# Patient Record
Sex: MALE | Race: WHITE | NOT HISPANIC OR LATINO | Employment: FULL TIME | URBAN - METROPOLITAN AREA
[De-identification: names, ages, dates, MRNs, and addresses within clinical notes are randomized per-mention and may not be internally consistent; named-entity substitution may affect disease eponyms.]

---

## 2017-03-21 ENCOUNTER — TELEPHONE (OUTPATIENT)
Dept: FAMILY MEDICINE | Facility: CLINIC | Age: 27
End: 2017-03-21

## 2017-03-21 ENCOUNTER — OFFICE VISIT (OUTPATIENT)
Dept: FAMILY MEDICINE | Facility: CLINIC | Age: 27
End: 2017-03-21
Payer: COMMERCIAL

## 2017-03-21 VITALS
SYSTOLIC BLOOD PRESSURE: 130 MMHG | OXYGEN SATURATION: 97 % | HEIGHT: 70 IN | DIASTOLIC BLOOD PRESSURE: 82 MMHG | WEIGHT: 212.9 LBS | HEART RATE: 72 BPM | BODY MASS INDEX: 30.48 KG/M2 | TEMPERATURE: 97.2 F

## 2017-03-21 DIAGNOSIS — J01.90 ACUTE SINUSITIS WITH SYMPTOMS > 10 DAYS: ICD-10-CM

## 2017-03-21 PROCEDURE — 99213 OFFICE O/P EST LOW 20 MIN: CPT | Performed by: FAMILY MEDICINE

## 2017-03-21 NOTE — PROGRESS NOTES
"  SUBJECTIVE:                                                    Julian Arroyo is a 26 year old male who presents to clinic today for the following health issues:    RESPIRATORY SYMPTOMS      Duration: 3 weeks     Description  Sinus problems     Severity: moderate    Accompanying signs and symptoms: sinus facial pain,congestion and mucus     History (predisposing factors):  Sinus problems in the past, surgery    Therapies tried and outcome:  Mucinex        Trend of symptoms: worsening  Denies These symptoms: fever, ear pain, myalgia  History of: recurrent sinusitis  Ill contacts: others at home with similar illness  Review of symptoms except as noted in the HPI is otherwise negative.    MEDICATIONS  No current outpatient prescriptions on file.     Allergies:  No Known Allergies    SOCIAL   reports that he has never smoked. He has never used smokeless tobacco.    OBJECTIVE:  /82  Pulse 72  Temp 97.2  F (36.2  C) (Oral)  Ht 5' 10\" (1.778 m)  Wt 212 lb 14.4 oz (96.6 kg)  SpO2 97%  BMI 30.55 kg/m2  General appearance: healthy, alert and cooperative.  Left Ear: normal; external ear canal and TM clear, nontender.  Right Ear: normal; external ear canal and TM clear, nontender.  Nose: mucosal erythema  Sinuses: maxillary tenderness bilaterally  Oropharynx: normal pharynx and buccal mucosa. Dental hygeine adequate.  Neck: normal; supple with no masses or nodes.    ASSESSMENT/PLAN:    ICD-10-CM    1. Acute sinusitis with symptoms > 10 days J01.90 amoxicillin-clavulanate (AUGMENTIN) 875-125 MG per tablet     History of sinus surgery and worsening symptoms now  Consideration of allergies as a trigger so try adding Flonase after this episode to see if that helps    Instructions on use of tylenol or ibuprofen for relief of pain and fever we given.  Call or return to clinic if these symptoms worsen or fail to improve as anticipated.  Micha Ramirez MD MPH    "

## 2017-03-21 NOTE — MR AVS SNAPSHOT
"              After Visit Summary   3/21/2017    Julian Arroyo    MRN: [id number]           Patient Information     Date Of Birth          1990        Visit Information        Provider Department      3/21/2017 12:15 PM Micha Ramirez MD [facility]        Today's Diagnoses     Acute sinusitis with symptoms > 10 days           Follow-ups after your visit        Follow-up notes from your care team     Return if symptoms worsen or fail to improve.      Who to contact     If you have questions or need follow up information about today's clinic visit or your schedule please contact [facility] directly at [phone].  Normal or non-critical lab and imaging results will be communicated to you by MyChart, letter or phone within 4 business days after the clinic has received the results. If you do not hear from us within 7 days, please contact the clinic through [employer] or phone. If you have a critical or abnormal lab result, we will notify you by phone as soon as possible.  Submit refill requests through [employer] or call your pharmacy and they will forward the refill request to us. Please allow 3 business days for your refill to be completed.          Additional Information About Your Visit        MyChart Information     [employer] gives you secure access to your electronic health record. If you see a primary care provider, you can also send messages to your care team and make appointments. If you have questions, please call your primary care clinic.  If you do not have a primary care provider, please call [phone] and they will assist you.        Care EveryWhere ID     This is your Care EveryWhere ID. This could be used by other organizations to access your [facility] medical records  [id number]        Your Vitals Were     Pulse Temperature Height Pulse Oximetry BMI (Body Mass Index)       72 97.2  F (36.2  C) (Oral) 5' 10\" (1.778 m) 97% 30.55 kg/m2        Blood Pressure from Last 3 " Encounters:   03/21/17 130/82   12/29/16 128/84   09/05/14 138/88    Weight from Last 3 Encounters:   03/21/17 212 lb 14.4 oz (96.6 kg)   12/29/16 200 lb 11.2 oz (91 kg)   09/05/14 217 lb 6.4 oz (98.6 kg)              Today, you had the following     No orders found for display         Where to get your medicines      These medications were sent to [place] [address] [facility] - [place], [place] - [address]  [address] [address]     Phone:  [phone]     amoxicillin-clavulanate 875-125 MG per tablet          Primary Care Provider Office Phone # Fax #    Micha Francisco Javier Ramirez [phone]       [facility] [address] [address]        Thank you!     Thank you for choosing [facility]  for your care. Our goal is always to provide you with excellent care. Hearing back from our patients is one way we can continue to improve our services. Please take a few minutes to complete the written survey that you may receive in the mail after your visit with us. Thank you!             Your Updated Medication List - Protect others around you: Learn how to safely use, store and throw away your medicines at www.disposemymeds.org.          This list is accurate as of: 3/21/17 12:32 PM.  Always use your most recent med list.                   Brand Name Dispense Instructions for use    amoxicillin-clavulanate 875-125 MG per tablet    AUGMENTIN    20 tablet    Take 1 tablet by mouth 2 times daily

## 2017-03-21 NOTE — TELEPHONE ENCOUNTER
Reason for call:  Patient reporting a symptom    Symptom or request: sinusitis    Duration (how long have symptoms been present): 3 weeks    Have you been treated for this before? Yes; previously took steroids     Additional comments: wants to speak to triage nurse whether  He should come in or not;          [facility] - [place], [place] - [address]      Phone Number patient can be reached at:  Home number on file [phone] (home)    Best Time:      Can we leave a detailed message on this number:  YES    Call taken on 3/21/2017 at 9:01 AM by Karla Liu

## 2018-03-02 ENCOUNTER — OFFICE VISIT (OUTPATIENT)
Dept: FAMILY MEDICINE | Facility: CLINIC | Age: 28
End: 2018-03-02
Payer: COMMERCIAL

## 2018-03-02 VITALS
HEART RATE: 75 BPM | OXYGEN SATURATION: 98 % | SYSTOLIC BLOOD PRESSURE: 134 MMHG | BODY MASS INDEX: 32.56 KG/M2 | WEIGHT: 227.4 LBS | DIASTOLIC BLOOD PRESSURE: 82 MMHG | HEIGHT: 70 IN

## 2018-03-02 DIAGNOSIS — R53.83 FATIGUE, UNSPECIFIED TYPE: ICD-10-CM

## 2018-03-02 DIAGNOSIS — Z00.00 ROUTINE GENERAL MEDICAL EXAMINATION AT A HEALTH CARE FACILITY: Primary | ICD-10-CM

## 2018-03-02 DIAGNOSIS — Z13.220 SCREENING CHOLESTEROL LEVEL: ICD-10-CM

## 2018-03-02 DIAGNOSIS — Z23 NEED FOR PROPHYLACTIC VACCINATION WITH TETANUS-DIPHTHERIA (TD): ICD-10-CM

## 2018-03-02 DIAGNOSIS — Z23 ENCOUNTER FOR IMMUNIZATION: ICD-10-CM

## 2018-03-02 LAB
CHOLEST SERPL-MCNC: 235 MG/DL
GLUCOSE SERPL-MCNC: 88 MG/DL (ref 70–99)
HDLC SERPL-MCNC: 50 MG/DL
LDLC SERPL CALC-MCNC: 167 MG/DL
NONHDLC SERPL-MCNC: 185 MG/DL
TRIGL SERPL-MCNC: 92 MG/DL
TSH SERPL DL<=0.005 MIU/L-ACNC: 1.19 MU/L (ref 0.4–4)

## 2018-03-02 PROCEDURE — 99395 PREV VISIT EST AGE 18-39: CPT | Mod: 25 | Performed by: FAMILY MEDICINE

## 2018-03-02 PROCEDURE — 87389 HIV-1 AG W/HIV-1&-2 AB AG IA: CPT | Performed by: FAMILY MEDICINE

## 2018-03-02 PROCEDURE — 90715 TDAP VACCINE 7 YRS/> IM: CPT | Performed by: FAMILY MEDICINE

## 2018-03-02 PROCEDURE — 82947 ASSAY GLUCOSE BLOOD QUANT: CPT | Performed by: FAMILY MEDICINE

## 2018-03-02 PROCEDURE — 36415 COLL VENOUS BLD VENIPUNCTURE: CPT | Performed by: FAMILY MEDICINE

## 2018-03-02 PROCEDURE — 80061 LIPID PANEL: CPT | Performed by: FAMILY MEDICINE

## 2018-03-02 PROCEDURE — 87491 CHLMYD TRACH DNA AMP PROBE: CPT | Performed by: FAMILY MEDICINE

## 2018-03-02 PROCEDURE — 84443 ASSAY THYROID STIM HORMONE: CPT | Performed by: FAMILY MEDICINE

## 2018-03-02 PROCEDURE — 87591 N.GONORRHOEAE DNA AMP PROB: CPT | Performed by: FAMILY MEDICINE

## 2018-03-02 PROCEDURE — 90471 IMMUNIZATION ADMIN: CPT | Performed by: FAMILY MEDICINE

## 2018-03-02 NOTE — NURSING NOTE
"Chief Complaint   Patient presents with     Patient Request     Bio-metric measurement      Labs     Would like thyroid tested - family hx of thyroid complications        Initial /82 (BP Location: Left arm, Patient Position: Chair, Cuff Size: Adult Regular)  Pulse 75  Ht 5' 10\" (1.778 m)  Wt 227 lb 6.4 oz (103.1 kg)  SpO2 98%  BMI 32.63 kg/m2 Estimated body mass index is 32.63 kg/(m^2) as calculated from the following:    Height as of this encounter: 5' 10\" (1.778 m).    Weight as of this encounter: 227 lb 6.4 oz (103.1 kg).  Medication Reconciliation: complete     Olga Plunkett MA     "

## 2018-03-02 NOTE — PATIENT INSTRUCTIONS
Preventive Health Recommendations  Male Ages 26 - 39    Yearly exam:             See your health care provider every year in order to  o   Review health changes.   o   Discuss preventive care.    o   Review your medicines if your doctor has prescribed any.    You should be tested each year for STDs (sexually transmitted diseases), if you re at risk.     After age 35, talk to your provider about cholesterol testing. If you are at risk for heart disease, have your cholesterol tested at least every 5 years.     If you are at risk for diabetes, you should have a diabetes test (fasting glucose).  Shots: Get a flu shot each year. Get a tetanus shot every 10 years.     Nutrition:    Eat at least 5 servings of fruits and vegetables daily.     Eat whole-grain bread, whole-wheat pasta and brown rice instead of white grains and rice.     Talk to your provider about Calcium and Vitamin D.     Lifestyle    Exercise for at least 150 minutes a week (30 minutes a day, 5 days a week). This will help you control your weight and prevent disease.     Limit alcohol to one drink per day.     No smoking.     Wear sunscreen to prevent skin cancer.     See your dentist every six months for an exam and cleaning.     Call if you would like help connecting to ENT over the coming year regarding your history of recurrent sinus infections with surgery when you were 19.  Consider slowing down eating process.    Consider chewing each mouthful 25 times before another mouthful. This can slow eating and improve  digestion as well as possibly decrease reflux.    Consider lotrimin cream to feet occasionally for mild athlete's foot.  Consider OTC hydrocortisone if skin redness on forehead becomes bothersome.

## 2018-03-02 NOTE — PROGRESS NOTES
SUBJECTIVE:   CC: Julian Arroyo is an 27 year old male who presents for preventative health visit.     Answers for HPI/ROS submitted by the patient on 3/2/2018   Annual Exam:  Getting at least 3 servings of Calcium per day:: Yes  Bi-annual eye exam:: Yes  Dental care twice a year:: Yes  Sleep apnea or symptoms of sleep apnea:: Daytime drowsiness, Excessive snoring, Sleep apnea  Diet:: Regular (no restrictions)  Frequency of exercise:: 6-7 days/week  Taking medications regularly:: Yes  Medication side effects:: Not applicable  Additional concerns today:: No  PHQ-2 Score: 0  Duration of exercise:: Greater than 60 minutes    Work is going well.    Today's PHQ-2 Score:   PHQ-2 ( 1999 [employer]) 3/2/2018 3/2/2018   Q1: Little interest or pleasure in doing things 0 0   Q2: Feeling down, depressed or hopeless 0 0   PHQ-2 Score 0 0   Q1: Little interest or pleasure in doing things Not at all -   Q2: Feeling down, depressed or hopeless Not at all -   PHQ-2 Score 0 -     Wonders about thyroid, has a family history of low thyroid. He does get tired throughout the day. Can fall asleep if sedentary for a bit.  He does get frequent sinus infections. Had surgery when he was 19. This past year seemed a bit of a flare. Had been to ENT in the past, thought about having a check up with ENT if it happens again. Has a prescription for flonase, not using it now, but uses it when it flares up and it helps.    Physical Activity: he is pretty good with physical activity. He works out six days a week.  Nutrition: He doesn't eat as healthy as he thinks he could. He can eat until he feels too full. He mostly eats at home. Choices of foods are generally good, but perhaps portion sizes can be big.     Abuse: Current or Past(Physical, Sexual or Emotional)- No  Do you feel safe in your environment - Yes    Social History   Substance Use Topics     Smoking status: Never Smoker     Smokeless tobacco: Never Used     Alcohol use 0.0 oz/week     "  If you drink alcohol do you typically have >3 drinks per day or >7 drinks per week? No                      Last PSA: No results found for: PSA    Reviewed orders with patient. Reviewed health maintenance and updated orders accordingly - Yes    Reviewed and updated as needed this visit by clinical staff  Tobacco  Allergies  Meds  Problems  Med Hx  Surg Hx  Fam Hx  Soc Hx          Reviewed and updated as needed this visit by Provider  Allergies  Meds  Problems          ROS:  C: NEGATIVE for fever, chills, change in weight  I: NEGATIVE for worrisome rashes, moles or lesions  E: NEGATIVE for vision changes or irritation  ENT: NEGATIVE for ear, mouth and throat problems  R: NEGATIVE for significant cough or SOB  CV: NEGATIVE for chest pain, palpitations or peripheral edema  GI: NEGATIVE for nausea, abdominal pain, or change in bowel habits - does have some heartburn - not constant   male: negative for dysuria, hematuria, decreased urinary stream, erectile dysfunction, urethral discharge  M: NEGATIVE for significant arthralgias or myalgia  N: NEGATIVE for weakness, dizziness or paresthesias  P: NEGATIVE for changes in mood or affect    OBJECTIVE:   /82 (BP Location: Left arm, Patient Position: Chair, Cuff Size: Adult Regular)  Pulse 75  Ht 5' 10\" (1.778 m)  Wt 227 lb 6.4 oz (103.1 kg)  SpO2 98%  BMI 32.63 kg/m2  EXAM:  GENERAL: healthy, alert and no distress  EYES: Eyes grossly normal to inspection, PERRL and conjunctivae and sclerae normal  HENT: ear canals and TM's normal, nose and mouth without ulcers or lesions  NECK: no adenopathy, no asymmetry, masses, or scars and thyroid normal to palpation  RESP: lungs clear to auscultation - no rales, rhonchi or wheezes  CV: regular rate and rhythm, normal S1 S2, no S3 or S4, no murmur, click or rub, no peripheral edema and peripheral pulses strong  ABDOMEN: soft, nontender, no hepatosplenomegaly, no masses and bowel sounds normal  MS: no gross " "musculoskeletal defects noted, no edema  SKIN: no suspicious lesions or rashes  NEURO: Normal strength and tone, mentation intact and speech normal  PSYCH: mentation appears normal, affect normal/bright    ASSESSMENT/PLAN:   Julian was seen today for patient request and labs.    Diagnoses and all orders for this visit:    Routine general medical examination at a health care facility  -     Glucose  -     HIV Antigen Antibody Combo  -     Neisseria gonorrhoeae PCR  -     Chlamydia trachomatis PCR    Need for prophylactic vaccination with tetanus-diphtheria (TD)    Screening cholesterol level  -     Lipid panel reflex to direct LDL Fasting    Fatigue, unspecified type  -     TSH with free T4 reflex    Encounter for immunization  -     TDAP VACCINE (ADACEL)  -     ADMIN 1st VACCINE        COUNSELING:  Reviewed preventive health counseling, as reflected in patient instructions       Regular exercise       Healthy diet/nutrition       reports that he has never smoked. He has never used smokeless tobacco.    Estimated body mass index is 32.63 kg/(m^2) as calculated from the following:    Height as of this encounter: 5' 10\" (1.778 m).    Weight as of this encounter: 227 lb 6.4 oz (103.1 kg).       Call if you would like help connecting to ENT over the coming year regarding your history of recurrent sinus infections with surgery when you were 19.  Consider slowing down eating process.   Consider chewing each mouthful 25 times before another mouthful. This can slow eating and improve  digestion as well as possibly decrease reflux.    Consider lotrimin cream to feet occasionally for mild athlete's foot.  Consider OTC hydrocortisone if skin redness on forehead becomes bothersome.        Counseling Resources:  ATP IV Guidelines  Pooled Cohorts Equation Calculator  FRAX Risk Assessment  ICSI Preventive Guidelines  Dietary Guidelines for Americans, 2010  USDA's MyPlate  ASA Prophylaxis  Lung CA Screening    Zack Bruner " MD [facility]  [facility]

## 2018-03-02 NOTE — MR AVS SNAPSHOT
After Visit Summary   3/2/2018    Julian Arroyo    MRN: [id number]           Patient Information     Date Of Birth          1990        Visit Information        Provider Department      3/2/2018 9:00 AM Zack Atkins MD [facility]        Today's Diagnoses     Need for prophylactic vaccination with tetanus-diphtheria (TD)    -  1    Routine general medical examination at a health care facility        Screening cholesterol level        Fatigue, unspecified type        Encounter for immunization          Care Instructions      Preventive Health Recommendations  Male Ages 26 - 39    Yearly exam:             See your health care provider every year in order to  o   Review health changes.   o   Discuss preventive care.    o   Review your medicines if your doctor has prescribed any.    You should be tested each year for STDs (sexually transmitted diseases), if you re at risk.     After age 35, talk to your provider about cholesterol testing. If you are at risk for heart disease, have your cholesterol tested at least every 5 years.     If you are at risk for diabetes, you should have a diabetes test (fasting glucose).  Shots: Get a flu shot each year. Get a tetanus shot every 10 years.     Nutrition:    Eat at least 5 servings of fruits and vegetables daily.     Eat whole-grain bread, whole-wheat pasta and brown rice instead of white grains and rice.     Talk to your provider about Calcium and Vitamin D.     Lifestyle    Exercise for at least 150 minutes a week (30 minutes a day, 5 days a week). This will help you control your weight and prevent disease.     Limit alcohol to one drink per day.     No smoking.     Wear sunscreen to prevent skin cancer.     See your dentist every six months for an exam and cleaning.     Call if you would like help connecting to ENT over the coming year regarding your history of recurrent sinus infections with surgery when you were  "19.  Consider slowing down eating process.    Consider chewing each mouthful 25 times before another mouthful. This can slow eating and improve  digestion as well as possibly decrease reflux.    Consider lotrimin cream to feet occasionally for mild athlete's foot.  Consider OTC hydrocortisone if skin redness on forehead becomes bothersome.            Follow-ups after your visit        Who to contact     If you have questions or need follow up information about today's clinic visit or your schedule please contact [facility] directly at [phone].  Normal or non-critical lab and imaging results will be communicated to you by [employer]art, letter or phone within 4 business days after the clinic has received the results. If you do not hear from us within 7 days, please contact the clinic through [employer] or phone. If you have a critical or abnormal lab result, we will notify you by phone as soon as possible.  Submit refill requests through [employer] or call your pharmacy and they will forward the refill request to us. Please allow 3 business days for your refill to be completed.          Additional Information About Your Visit        HealthcareMagicharMuteButton Information     [employer] gives you secure access to your electronic health record. If you see a primary care provider, you can also send messages to your care team and make appointments. If you have questions, please call your primary care clinic.  If you do not have a primary care provider, please call [phone] and they will assist you.        Care EveryWhere ID     This is your Care EveryWhere ID. This could be used by other organizations to access your [place] medical records  [id number]        Your Vitals Were     Pulse Height Pulse Oximetry BMI (Body Mass Index)          75 5' 10\" (1.778 m) 98% 32.63 kg/m2         Blood Pressure from Last 3 Encounters:   03/02/18 134/82   03/21/17 130/82   12/29/16 128/84    Weight from Last 3 Encounters:   03/02/18 227 lb " 6.4 oz (103.1 kg)   03/21/17 212 lb 14.4 oz (96.6 kg)   12/29/16 200 lb 11.2 oz (91 kg)              We Performed the Following     ADMIN 1st VACCINE     Chlamydia trachomatis PCR     Glucose     HIV Antigen Antibody Combo     Lipid panel reflex to direct LDL Fasting     Neisseria gonorrhoeae PCR     TDAP VACCINE (ADACEL)     TSH with free T4 reflex        Primary Care Provider Office Phone # Fax #    Micha Francisco Javier Ramirez [phone] [address] [address]        Equal Access to Services     [facility]: Hadii aad ku hadasho Sorose mary, waaxda luqadaha, qaybta kaalmada neeru, antony walsh . So [facility] [phone].    ATENCIÓN: Si habla español, tiene a gaspar disposición servicios gratuitos de asistencia lingüística. Ll[facility], [facility] [phone].    We comply with applicable federal civil rights laws and [place] laws. We do not discriminate on the basis of race, color, national origin, age, disability, sex, sexual orientation, or gender identity.            Thank you!     Thank you for choosing [facility]  for your care. Our goal is always to provide you with excellent care. Hearing back from our patients is one way we can continue to improve our services. Please take a few minutes to complete the written survey that you may receive in the mail after your visit with us. Thank you!             Your Updated Medication List - Protect others around you: Learn how to safely use, store and throw away your medicines at www.disposemymeds.org.      Notice  As of 3/2/2018  9:48 AM    You have not been prescribed any medications.

## 2018-03-04 LAB
C TRACH DNA SPEC QL NAA+PROBE: NEGATIVE
N GONORRHOEA DNA SPEC QL NAA+PROBE: NEGATIVE
SPECIMEN SOURCE: NORMAL
SPECIMEN SOURCE: NORMAL

## 2018-03-05 LAB — HIV 1+2 AB+HIV1 P24 AG SERPL QL IA: NONREACTIVE

## 2018-07-10 ENCOUNTER — OFFICE VISIT (OUTPATIENT)
Dept: FAMILY MEDICINE | Facility: CLINIC | Age: 28
End: 2018-07-10
Payer: COMMERCIAL

## 2018-07-10 VITALS
OXYGEN SATURATION: 99 % | TEMPERATURE: 98.2 F | HEART RATE: 72 BPM | WEIGHT: 215 LBS | DIASTOLIC BLOOD PRESSURE: 84 MMHG | HEIGHT: 70 IN | SYSTOLIC BLOOD PRESSURE: 142 MMHG | BODY MASS INDEX: 30.78 KG/M2

## 2018-07-10 DIAGNOSIS — R07.0 THROAT PAIN: ICD-10-CM

## 2018-07-10 DIAGNOSIS — J01.00 ACUTE NON-RECURRENT MAXILLARY SINUSITIS: Primary | ICD-10-CM

## 2018-07-10 LAB
DEPRECATED S PYO AG THROAT QL EIA: NORMAL
SPECIMEN SOURCE: NORMAL

## 2018-07-10 PROCEDURE — 99213 OFFICE O/P EST LOW 20 MIN: CPT | Performed by: INTERNAL MEDICINE

## 2018-07-10 PROCEDURE — 87880 STREP A ASSAY W/OPTIC: CPT | Performed by: INTERNAL MEDICINE

## 2018-07-10 PROCEDURE — 87081 CULTURE SCREEN ONLY: CPT | Performed by: INTERNAL MEDICINE

## 2018-07-10 RX ORDER — LORATADINE 10 MG/1
10 TABLET ORAL DAILY
COMMUNITY
End: 2021-11-15

## 2018-07-10 RX ORDER — FLUTICASONE PROPIONATE 50 MCG
1 SPRAY, SUSPENSION (ML) NASAL DAILY
COMMUNITY
End: 2021-11-15

## 2018-07-10 ASSESSMENT — ENCOUNTER SYMPTOMS: MYALGIAS: 1

## 2018-07-10 NOTE — MR AVS SNAPSHOT
After Visit Summary   7/10/2018    Julian Arroyo    MRN: [id number]           Patient Information     Date Of Birth          1990        Visit Information        Provider Department      7/10/2018 3:00 PM Carissa Shukla MD [facility]        Today's Diagnoses     Acute non-recurrent maxillary sinusitis    -  1    Throat pain          Care Instructions    flonase  Nasal saline    antibiotics   yogurt    Fluids  Rest    Call or return to clinic if symptoms worsen or fail to improve as anticipated.            Follow-ups after your visit        Who to contact     If you have questions or need follow up information about today's clinic visit or your schedule please contact [facility] directly at [phone].  Normal or non-critical lab and imaging results will be communicated to you by MyChart, letter or phone within 4 business days after the clinic has received the results. If you do not hear from us within 7 days, please contact the clinic through [employer] or phone. If you have a critical or abnormal lab result, we will notify you by phone as soon as possible.  Submit refill requests through [employer] or call your pharmacy and they will forward the refill request to us. Please allow 3 business days for your refill to be completed.          Additional Information About Your Visit        MyChart Information     [employer] gives you secure access to your electronic health record. If you see a primary care provider, you can also send messages to your care team and make appointments. If you have questions, please call your primary care clinic.  If you do not have a primary care provider, please call [phone] and they will assist you.        Care EveryWhere ID     This is your Care EveryWhere ID. This could be used by other organizations to access your [facility] medical records  [id number]        Your Vitals Were     Pulse Temperature Height Pulse Oximetry BMI  "(Body Mass Index)       72 98.2  F (36.8  C) (Oral) 5' 10\" (1.778 m) 99% 30.85 kg/m2        Blood Pressure from Last 3 Encounters:   07/10/18 142/84   03/02/18 134/82   03/21/17 130/82    Weight from Last 3 Encounters:   07/10/18 215 lb (97.5 kg)   03/02/18 227 lb 6.4 oz (103.1 kg)   03/21/17 212 lb 14.4 oz (96.6 kg)              We Performed the Following     Beta strep group A culture     Strep, Rapid Screen          Today's Medication Changes          These changes are accurate as of 7/10/18  3:28 PM.  If you have any questions, ask your nurse or doctor.               Start taking these medicines.        Dose/Directions    amoxicillin-clavulanate 875-125 MG per tablet   Commonly known as:  AUGMENTIN   Used for:  Acute non-recurrent maxillary sinusitis   Started by:  Carissa Shukla MD        Dose:  1 tablet   Take 1 tablet by mouth 2 times daily   Quantity:  20 tablet   Refills:  0            Where to get your medicines      These medications were sent to [place] [address] [place], [place] - [address]  [address] [facility] [address]     Phone:  [phone]     amoxicillin-clavulanate 875-125 MG per tablet                Primary Care Provider Office Phone # Fax #    Micha Francisco Javier Ramirez [phone] [address] [facility] [address]        Equal Access to Services     JOSE CARRENO AH: Hadii sarai ku hadasho Soomaali, waaxda luqadaha, qaybta kaalmada adeegyada, waxay minh walsh . So [facility] [phone].    ATENCIÓN: Si habla nishaañol, tiene a gaspar disposición servicios gratuitos de asistencia lingüística. Llame al [phone].    We comply with applicable federal civil rights laws and [place] laws. We do not discriminate on the basis of race, color, national origin, age, disability, sex, sexual orientation, or gender identity.            Thank you!     Thank you for choosing [facility]  for your care. Our " goal is always to provide you with excellent care. Hearing back from our patients is one way we can continue to improve our services. Please take a few minutes to complete the written survey that you may receive in the mail after your visit with us. Thank you!             Your Updated Medication List - Protect others around you: Learn how to safely use, store and throw away your medicines at www.disposemymeds.org.          This list is accurate as of 7/10/18  3:28 PM.  Always use your most recent med list.                   Brand Name Dispense Instructions for use Diagnosis    amoxicillin-clavulanate 875-125 MG per tablet    AUGMENTIN    20 tablet    Take 1 tablet by mouth 2 times daily    Acute non-recurrent maxillary sinusitis       fluticasone 50 MCG/ACT spray    FLONASE     Spray 1 spray into both nostrils daily        loratadine 10 MG tablet    CLARITIN     Take 10 mg by mouth daily        PROPECIA PO      Take 1 mg by mouth daily

## 2018-07-10 NOTE — PATIENT INSTRUCTIONS
flonase  Nasal saline    antibiotics   yogurt    Fluids  Rest    Call or return to clinic if symptoms worsen or fail to improve as anticipated.

## 2018-07-10 NOTE — PROGRESS NOTES
"SUBJECTIVE:   Julian Arroyo is a 27 year old male presenting with a chief complaint of   Chief Complaint   Patient presents with     Pharyngitis     Pt in clinic to have eval for ear pain, sore throat, and aches.     Otalgia     Nasal Congestion     Generalized Body Aches       He is an established patient of [facility].    URI Adult    Onset of symptoms was 2 week(s) ago.  Course of illness is worsening.    Current and Associated symptoms: stuffy nose, sore throat and facial pain/pressure  Initially tought allergies  Worse now with right facial & mucous  Pain all over  right sore throat   Treatment measures tried include flonase, claritin.  Predisposing factors include ill contact: unknown and seasonal allergies.  Hx sinus surgery      Review of Systems   Constitutional:        Feverish   Musculoskeletal: Positive for myalgias.       Past Medical History:   Diagnosis Date     Hypertension 2014     Family History   Problem Relation Age of Onset     Lipids Father      Alcohol/Drug Father      Hyperlipidemia Father      Hypertension Maternal Grandfather      Thyroid Disease Maternal Grandfather      Current Outpatient Prescriptions   Medication Sig Dispense Refill     amoxicillin-clavulanate (AUGMENTIN) 875-125 MG per tablet Take 1 tablet by mouth 2 times daily 20 tablet 0     Finasteride (PROPECIA PO) Take 1 mg by mouth daily       fluticasone (FLONASE) 50 MCG/ACT spray Spray 1 spray into both nostrils daily       loratadine (CLARITIN) 10 MG tablet Take 10 mg by mouth daily       Social History   Substance Use Topics     Smoking status: Never Smoker     Smokeless tobacco: Never Used     Alcohol use 0.0 oz/week       OBJECTIVE  /84  Pulse 72  Temp 98.2  F (36.8  C) (Oral)  Ht 5' 10\" (1.778 m)  Wt 215 lb (97.5 kg)  SpO2 99%  BMI 30.85 kg/m2    Physical Exam   Constitutional: He appears well-developed and well-nourished.   HENT:   Nose: Nose normal.   Mouth/Throat: Oropharynx is clear and moist. No " oropharyngeal exudate.   Tm nl b   Cardiovascular: Normal rate, regular rhythm and normal heart sounds.    Pulmonary/Chest: Effort normal and breath sounds normal.       Labs:  Results for orders placed or performed in visit on 07/10/18 (from the past 24 hour(s))   Strep, Rapid Screen   Result Value Ref Range    Specimen Description Throat     Rapid Strep A Screen       NEGATIVE: No Group A streptococcal antigen detected by immunoassay, await culture report.           ASSESSMENT:      ICD-10-CM    1. Acute non-recurrent maxillary sinusitis J01.00 amoxicillin-clavulanate (AUGMENTIN) 875-125 MG per tablet   2. Throat pain R07.0 Strep, Rapid Screen     Beta strep group A culture        Medical Decision Making:  Hx sinus surg x 2  On antihistamine  PLAN:      Patient Instructions   flonase  Nasal saline    antibiotics   yogurt    Fluids  Rest    Call or return to clinic if symptoms worsen or fail to improve as anticipated.

## 2018-07-11 LAB
BACTERIA SPEC CULT: NORMAL
SPECIMEN SOURCE: NORMAL

## 2019-08-05 ENCOUNTER — OFFICE VISIT (OUTPATIENT)
Dept: FAMILY MEDICINE | Facility: CLINIC | Age: 29
End: 2019-08-05
Payer: COMMERCIAL

## 2019-08-05 VITALS
SYSTOLIC BLOOD PRESSURE: 145 MMHG | DIASTOLIC BLOOD PRESSURE: 84 MMHG | HEART RATE: 73 BPM | WEIGHT: 217.8 LBS | TEMPERATURE: 98.2 F | BODY MASS INDEX: 31.25 KG/M2

## 2019-08-05 DIAGNOSIS — K64.4 EXTERNAL HEMORRHOIDS: Primary | ICD-10-CM

## 2019-08-05 PROCEDURE — 99213 OFFICE O/P EST LOW 20 MIN: CPT | Performed by: PHYSICIAN ASSISTANT

## 2019-08-05 NOTE — PATIENT INSTRUCTIONS
If not improving by Wednesday call clinic or mychart me. We will try the nitroglycerin ointment.   Increase fiber and water- goal soft stools.       Patient Education     Taking a Sitz Bath  A sitz bath is a type of therapy done by sitting in warm, shallow water. It can help soothe pain, itching, and other symptoms in the anal and genital areas. It can also help keep these areas clean if you can t take a bath or shower. Sitz is from the [place]ian word sitzen, which means to sit.  Why a sitz bath is done  A sitz bath helps clean and treat certain problems in the anal area, genital area, and the perineum. The perineum is the area between the anus and the vulva in women. In men, it is between the anus and the scrotum. A sitz bath helps increase blood flow to these areas and relax the muscles.  A sitz bath may be done to:    Help ease pain and itching from hemorrhoids    Help ease pain from an anal fissure    Bathe and soothe the perineum after childbirth    Clean and soothe the anal area or perineum after surgery    Ease prostate pain during prostatitis or after a procedure    Help ease period cramps    Clean the anal and genital areas if you can t take a bath or shower  How a sitz bath is done  A sitz bath can be done in 2 ways: in a bathtub or using a sitz bath bowl.  In a bathtub  To take a sitz bath in a tub:    Make sure your bathtub is clean. Fill a clean bathtub with 3 to 4 inches of warm water. In some cases, your healthcare provider may tell you to use cold water instead.    Add salt or medicine to the water if advised by your healthcare provider.    Gently lower yourself down into the bathtub and sit on the bottom of the tub. Don t get into the bath unless the water temperature is comfortable.    Hold on to a railing. Or ask for help from a family member, friend, or caregiver if needed.  If you have a wound, the water may cause pain at first. But the pain should ease. Make sure the area that needs treating is  under the water. You can bend your knees up to help expose the area that needs contact with the water.  Using a sitz bath bowl  A sitz bath bowl is a special plastic container that s placed on a toilet. You can buy this in many drugstores and medical supply stores. To take a sitz bath this way:    Lift the toilet lid and seat. Place a cleaned plastic sitz bath bowl on the rim of your toilet. Make sure the bowl is firmly in place and won t move around.    Fill the sitz bath bowl with warm water from a pitcher or other container. The water should cover your perineum. Make sure the water temperature is comfortable.    Add salt or medicine to the water if advised by your healthcare provider. Or follow the package instructions about how to fill the bowl. Some kits come with a plastic bag and tubing. This lets you stream water into the bowl and at the area of your body that needs treatment. The bowl may have a slot or hole in the back. This lets water flow out so it doesn t overflow onto the floor. If there is no hole, be careful not to fill the bowl too full.    Gently sit down on the sitz bath bowl. Hold on to a railing. Or ask for help from a family member, friend, or caregiver if needed.  If you have a wound, the water may cause pain at first, but the pain should ease. Make sure the area that needs treating is under the water.  For any type of sitz bath:  1. Sit in the water for 10 to 20 minutes.  2. Add more warm water as needed to keep the water comfortable.  3. Get up slowly from the tub or toilet. You may feel lightheaded or dizzy. Hold on to a railing. Or ask for help from a family member, friend, or caregiver if needed.  4. Gently pat your anal area, perineum, and genitals dry with a clean towel. Don t rub the area.  5. Wash your hands. Put any ointment or cream on the area, if advised.  6. Wash the bathtub or sitz bath bowl with soap and water after each use.  7. Use a sitz bath 2 to 3 times a day, or as often  as your healthcare provider advises.  When to call your healthcare provider  Call your healthcare provider right away if you have any of these:    Fever of 100.4 F (38 C) or higher, or as directed    Redness, swelling, or fluid leaking from a cut (incision) that gets worse    Pain that gets worse    Symptoms that don t get better, or get worse    New symptoms   Date Last Reviewed: 5/1/2016 2000-2018 [employer]. [address] [place] [address], [place] [address]. All rights reserved. This information is not intended as a substitute for professional medical care. Always follow your healthcare professional's instructions.

## 2019-08-05 NOTE — PROGRESS NOTES
Subjective     Julian Arroyo is a 28 year old male who presents to clinic today for the following health issues:    HPI   Concern - Rectum Pain  Onset: x4 days- got worse initially and now staying the same.     Description:   Pt has been having extreme rectum pain. Pt stated it is so bad that he feels like passing out. Pt states that it is a sharp stabbing pain in his rectal area.     Intensity: severe    Progression of Symptoms:  worsening    Accompanying Signs & Symptoms:      Previous history of similar problem:       Precipitating factors:   Worsened by: bowel movement    Alleviating factors:  Improved by:     Therapies Tried and outcome: tried preparation H, no improvement or worsening of symptoms with this.     No anal intercourse.     Constant pain throughout the day.   With a bowel movement extremely painful.   Daily soft bowel movement.   Darker red blood with bowel movements. No bleeding outside of a bowel movement     No abdominal pain. Felt nauseated on Sunday, but feels fine today.         Reviewed and updated as needed this visit by Provider  Tobacco  Allergies  Meds  Problems  Med Hx  Surg Hx  Fam Hx         Review of Systems   ROS COMP: Constitutional, HEENT, cardiovascular, pulmonary, gi and gu systems are negative, except as otherwise noted.      Objective    BP (!) 145/84 (BP Location: Right arm, Patient Position: Chair, Cuff Size: Adult Large)   Pulse 73   Temp 98.2  F (36.8  C) (Oral)   Wt 98.8 kg (217 lb 12.8 oz)   BMI 31.25 kg/m    Body mass index is 31.25 kg/m .  Physical Exam   GENERAL: healthy, alert and no distress  RECTAL (male): normal sphincter tone, no rectal masses- pain with palpation of a small red non thrombosed external hemorrhoid. Pain with digital rectal exam and bright red blood seen on glove.     Diagnostic Test Results:  none         Assessment & Plan       ICD-10-CM    1. External hemorrhoids K64.4 hydrocortisone (ANUSOL-HC) 2.5 % cream      External hemorrhoid  noted and pain with PRATIBHA. Will treat with anusol and SITZ baths. Increased fiber and water encouraged. If not improving consider nitroglycerin ointment for a possible small tear. Patient will update if not improving.       No follow-ups on file.    Linnea Schmidt PA-C  [facility]

## 2020-03-02 ENCOUNTER — HEALTH MAINTENANCE LETTER (OUTPATIENT)
Age: 30
End: 2020-03-02

## 2020-11-03 ENCOUNTER — OFFICE VISIT (OUTPATIENT)
Dept: FAMILY MEDICINE | Facility: CLINIC | Age: 30
End: 2020-11-03
Payer: COMMERCIAL

## 2020-11-03 VITALS
WEIGHT: 220 LBS | HEART RATE: 76 BPM | RESPIRATION RATE: 17 BRPM | HEIGHT: 70 IN | OXYGEN SATURATION: 96 % | SYSTOLIC BLOOD PRESSURE: 159 MMHG | DIASTOLIC BLOOD PRESSURE: 91 MMHG | TEMPERATURE: 97.6 F | BODY MASS INDEX: 31.5 KG/M2

## 2020-11-03 DIAGNOSIS — Z71.1 CONCERN ABOUT STD IN MALE WITHOUT DIAGNOSIS: Primary | ICD-10-CM

## 2020-11-03 PROCEDURE — 96372 THER/PROPH/DIAG INJ SC/IM: CPT | Performed by: FAMILY MEDICINE

## 2020-11-03 PROCEDURE — 99213 OFFICE O/P EST LOW 20 MIN: CPT | Mod: 25 | Performed by: FAMILY MEDICINE

## 2020-11-03 PROCEDURE — 90471 IMMUNIZATION ADMIN: CPT | Performed by: FAMILY MEDICINE

## 2020-11-03 PROCEDURE — 90686 IIV4 VACC NO PRSV 0.5 ML IM: CPT | Performed by: FAMILY MEDICINE

## 2020-11-03 RX ORDER — AZITHROMYCIN 500 MG/1
1000 TABLET, FILM COATED ORAL DAILY
Qty: 2 TABLET | Refills: 0 | Status: SHIPPED | OUTPATIENT
Start: 2020-11-03 | End: 2020-11-04

## 2020-11-03 RX ORDER — CEFTRIAXONE SODIUM 250 MG
250 VIAL (EA) INJECTION ONCE
Status: COMPLETED | OUTPATIENT
Start: 2020-11-03 | End: 2020-11-03

## 2020-11-03 RX ADMIN — Medication 250 MG: at 15:52

## 2020-11-03 ASSESSMENT — MIFFLIN-ST. JEOR: SCORE: 1954.16

## 2020-11-03 NOTE — PROGRESS NOTES
"Subjective     Julian Arroyo is a 30 year old male who presents to clinic today for the following health issues:    HPI       Patient states he was exposed by a partner who tested positive to chlamydia was informed today and he is not having any symptoms      reports being sexually active and has had partner(s) who are Male.      Last antibiotic reported as: none  Current Outpatient Medications   Medication     azithromycin (ZITHROMAX) 500 MG tablet     Finasteride (PROPECIA PO)     fluticasone (FLONASE) 50 MCG/ACT spray     hydrocortisone (ANUSOL-HC) 2.5 % cream     loratadine (CLARITIN) 10 MG tablet     Current Facility-Administered Medications   Medication     cefTRIAXone (ROCEPHIN) injection 250 mg     I have reviewed the patient's medical history in detail; there are no changes to the history as noted in [employer].    ROS: As per HPI.  Constitutional: no fevers/sweats/chills   GYN: no penile discharge    EXAM  BP (!) 159/91   Pulse 76   Temp 97.6  F (36.4  C) (Tympanic)   Resp 17   Ht 1.778 m (5' 10\")   Wt 99.8 kg (220 lb)   SpO2 96%   BMI 31.57 kg/m    Gen: Healthy appearing male in no apparent distress   deferred  No results found for this or any previous visit (from the past 24 hour(s)).    ASSESSMENT/PLAN:  1. Concern about STD in male without diagnosis  Cover for STI exposure  - azithromycin (ZITHROMAX) 500 MG tablet; Take 2 tablets (1,000 mg) by mouth daily for 1 day  Dispense: 2 tablet; Refill: 0  - cefTRIAXone (ROCEPHIN) injection 250 mg    Follow up: Return as needed if symptoms fail to improve    "

## 2021-04-24 ENCOUNTER — HEALTH MAINTENANCE LETTER (OUTPATIENT)
Age: 31
End: 2021-04-24

## 2021-10-03 ENCOUNTER — HEALTH MAINTENANCE LETTER (OUTPATIENT)
Age: 31
End: 2021-10-03

## 2021-11-15 ENCOUNTER — OFFICE VISIT (OUTPATIENT)
Dept: FAMILY MEDICINE | Facility: CLINIC | Age: 31
End: 2021-11-15
Payer: COMMERCIAL

## 2021-11-15 VITALS
WEIGHT: 224 LBS | SYSTOLIC BLOOD PRESSURE: 128 MMHG | HEIGHT: 70 IN | TEMPERATURE: 98.4 F | BODY MASS INDEX: 32.07 KG/M2 | RESPIRATION RATE: 18 BRPM | OXYGEN SATURATION: 95 % | HEART RATE: 68 BPM | DIASTOLIC BLOOD PRESSURE: 66 MMHG

## 2021-11-15 DIAGNOSIS — Z20.2 STD EXPOSURE: Primary | ICD-10-CM

## 2021-11-15 DIAGNOSIS — Z23 HIGH PRIORITY FOR 2019-NCOV VACCINE: ICD-10-CM

## 2021-11-15 PROCEDURE — 0004A COVID-19,PF,PFIZER (12+ YRS): CPT | Performed by: PHYSICIAN ASSISTANT

## 2021-11-15 PROCEDURE — 87491 CHLMYD TRACH DNA AMP PROBE: CPT | Performed by: PHYSICIAN ASSISTANT

## 2021-11-15 PROCEDURE — 91300 COVID-19,PF,PFIZER (12+ YRS): CPT | Performed by: PHYSICIAN ASSISTANT

## 2021-11-15 PROCEDURE — 90471 IMMUNIZATION ADMIN: CPT | Performed by: PHYSICIAN ASSISTANT

## 2021-11-15 PROCEDURE — 99213 OFFICE O/P EST LOW 20 MIN: CPT | Mod: 25 | Performed by: PHYSICIAN ASSISTANT

## 2021-11-15 PROCEDURE — 90686 IIV4 VACC NO PRSV 0.5 ML IM: CPT | Performed by: PHYSICIAN ASSISTANT

## 2021-11-15 PROCEDURE — 87591 N.GONORRHOEAE DNA AMP PROB: CPT | Performed by: PHYSICIAN ASSISTANT

## 2021-11-15 RX ORDER — EMTRICITABINE AND TENOFOVIR ALAFENAMIDE 200; 25 MG/1; MG/1
1 TABLET ORAL DAILY
COMMUNITY
Start: 2021-11-15

## 2021-11-15 ASSESSMENT — MIFFLIN-ST. JEOR: SCORE: 1967.31

## 2021-11-15 NOTE — PROGRESS NOTES
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  Plan: COVID-19,PF,PFIZER (12+ Yrs PURPLE LABEL)  Also given flu shot today    See pcp for routine visit in 1-2 months      NIMESH Conner   Julian is a 31 year old who presents for the following health issues     HPI       Partner recently tested positive for gonorrhea and so he would like to be tested  He currently does not have any symptoms   He is on PREP therapy and is tested for HIV every 3 months, declines HIV testing today  No dysuria, no frequency, no discharge     Works in education, 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     Physical Exam     GENERAL: healthy, alert and no distress  RESP: lungs clear to auscultation - no rales, no rhonchi, no wheezes  CV: regular rates and rhythm, normal S1 S2, no S3 or S4 and no murmur, no click or rub         "

## 2021-11-16 LAB
C TRACH DNA SPEC QL NAA+PROBE: NEGATIVE
N GONORRHOEA DNA SPEC QL NAA+PROBE: NEGATIVE

## 2021-11-16 NOTE — RESULT ENCOUNTER NOTE
Dear Julian,     Here are your recent results: gonorrhea and chlamydia tests both negative.    Please let us know if you have any questions or concerns.    Regards,  Dona Plunkett PA-C

## 2021-12-16 NOTE — PROGRESS NOTES
SUBJECTIVE:   CC: Julian Arroyo is an 31 year old male who presents for preventative health visit.       Patient has been advised of split billing requirements and indicates understanding: Yes  Healthy Habits:     Getting at least 3 servings of Calcium per day:  Yes    Bi-annual eye exam:  Yes    Dental care twice a year:  Yes    Sleep apnea or symptoms of sleep apnea:  Excessive snoring    Diet:  Regular (no restrictions)    Frequency of exercise:  6-7 days/week    Duration of exercise:  Greater than 60 minutes    Taking medications regularly:  Yes    Medication side effects:  None    PHQ-2 Total Score: 0    Additional concerns today:  Yes    Family Hx of thyroid problems,   Some fatigue  No recent weight gain  Wt Readings from Last 4 Encounters:   12/20/21 96.5 kg (212 lb 11.2 oz)   11/15/21 101.6 kg (224 lb)   11/03/20 99.8 kg (220 lb)   08/05/19 98.8 kg (217 lb 12.8 oz)       - Would like Cholesterol and Thyroid Checked    Today's PHQ-2 Score:   PHQ-2 ( 1999 Pfizer) 12/20/2021   Q1: Little interest or pleasure in doing things 0   Q2: Feeling down, depressed or hopeless 0   PHQ-2 Score 0   PHQ-2 Total Score (12-17 Years)- Positive if 3 or more points; Administer PHQ-A if positive -   Q1: Little interest or pleasure in doing things Not at all   Q2: Feeling down, depressed or hopeless Not at all   PHQ-2 Score 0       Abuse: Current or Past(Physical, Sexual or Emotional)- No  Do you feel safe in your environment? Yes    Have you ever done Advance Care Planning? (For example, a Health Directive, POLST, or a discussion with a medical provider or your loved ones about your wishes): No, advance care planning information given to patient to review.  Patient declined advance care planning discussion at this time.    Social History     Tobacco Use     Smoking status: Never Smoker     Smokeless tobacco: Never Used   Substance Use Topics     Alcohol use: Yes     Alcohol/week: 0.0 standard drinks     If you drink alcohol do  you typically have >3 drinks per day or >7 drinks per week? No    Alcohol Use 12/20/2021   Prescreen: >3 drinks/day or >7 drinks/week? No   Prescreen: >3 drinks/day or >7 drinks/week? -   No flowsheet data found.    Last PSA: No results found for: PSA    Reviewed orders with patient. Reviewed health maintenance and updated orders accordingly - Yes  Lab work is in process  Labs reviewed in EPIC  BP Readings from Last 3 Encounters:   12/20/21 (!) 149/91   11/15/21 128/66   11/03/20 (!) 159/91    Wt Readings from Last 3 Encounters:   12/20/21 96.5 kg (212 lb 11.2 oz)   11/15/21 101.6 kg (224 lb)   11/03/20 99.8 kg (220 lb)               Patient Active Problem List   Diagnosis     Prehypertension     Past Surgical History:   Procedure Laterality Date     SINUS SURGERY       TONSILLECTOMY         Social History     Tobacco Use     Smoking status: Never Smoker     Smokeless tobacco: Never Used   Substance Use Topics     Alcohol use: Yes     Alcohol/week: 0.0 standard drinks     Family History   Problem Relation Age of Onset     Lipids Father      Alcohol/Drug Father      Hyperlipidemia Father      Hypertension Maternal Grandfather      Thyroid Disease Maternal Grandfather          Current Outpatient Medications   Medication Sig Dispense Refill     emtricitabine-tenofovir AF (DESCOVY) 200-25 MG per tablet Take 1 tablet by mouth daily         Reviewed and updated as needed this visit by clinical staff                Reviewed and updated as needed this visit by Provider                   Review of Systems   Constitutional: Negative for chills and fever.   HENT: Negative for congestion, ear pain, hearing loss and sore throat.    Eyes: Negative for pain and visual disturbance.   Respiratory: Negative for cough and shortness of breath.    Cardiovascular: Negative for chest pain, palpitations and peripheral edema.   Gastrointestinal: Negative for abdominal pain, constipation, diarrhea, heartburn, hematochezia and nausea.  "  Genitourinary: Negative for dysuria, frequency, genital sores, hematuria, impotence, penile discharge and urgency.   Musculoskeletal: Negative for arthralgias, joint swelling and myalgias.   Skin: Negative for rash.   Neurological: Negative for dizziness, weakness, headaches and paresthesias.   Psychiatric/Behavioral: Negative for mood changes. The patient is nervous/anxious.        OBJECTIVE:   BP (!) 149/91   Pulse 62   Temp 98  F (36.7  C) (Tympanic)   Ht 1.755 m (5' 9.1\")   Wt 96.5 kg (212 lb 11.2 oz)   SpO2 97%   BMI 31.32 kg/m      Physical Exam    General Appearance: Pleasant, alert, in no acute respiratory distress.  Head Exam: Normal. Normocephalic, atraumatic. No sinus tenderness.  Eye Exam: Normal external eye, conjunctiva, lids. RYAN.  Ear Exam: Normal auditory canals and external ears. Non-tender.  Left TM-normal. Right TM-normal.  Neck Exam: Supple, no obvious thyroid enlargement  Chest/Respiratory Exam: Normal, comfortable, easy respirations. Chest wall normal. Lungs are clear to auscultation. No wheezing, crackles, or rhonchi.  Cardiovascular Exam: Regular rate and rhythm. No murmur, gallop, or rubs.  Musculoskeletal Exam: Back is non-tender, full ROM of upper and lower extremities.  Skin: no rash, warm and dry.   Neurologic Exam: Nonfocal, no tremor. Normal gait.  Psychiatric Exam: Alert - appropriate, normal affect      ASSESSMENT/PLAN:       ICD-10-CM    1. Routine general medical examination at a health care facility  Z00.00 REVIEW OF HEALTH MAINTENANCE PROTOCOL ORDERS   2. Lipid screening  Z13.220 Lipid Profile (Chol, Trig, HDL, LDL calc)   3. Diabetes mellitus screening  Z13.1 Glucose   4. Fatigue, unspecified type  R53.83 TSH with free T4 reflex     Patient has been advised of split billing requirements and indicates understanding: Yes     COUNSELING:   Reviewed preventive health counseling, as reflected in patient instructions       Regular exercise       Healthy " "diet/nutrition    Estimated body mass index is 32.14 kg/m  as calculated from the following:    Height as of 11/15/21: 1.778 m (5' 10\").    Weight as of 11/15/21: 101.6 kg (224 lb).     He reports that he has never smoked. He has never used smokeless tobacco.    Counseling Resources:  ATP IV Guidelines  Pooled Cohorts Equation Calculator  FRAX Risk Assessment  ICSI Preventive Guidelines  Dietary Guidelines for Americans, 2010  USDA's MyPlate  ASA Prophylaxis  Lung CA Screening    Micha Ramirez MD  [facility]  "

## 2021-12-20 ENCOUNTER — OFFICE VISIT (OUTPATIENT)
Dept: FAMILY MEDICINE | Facility: CLINIC | Age: 31
End: 2021-12-20
Payer: COMMERCIAL

## 2021-12-20 VITALS
DIASTOLIC BLOOD PRESSURE: 82 MMHG | HEART RATE: 62 BPM | OXYGEN SATURATION: 97 % | HEIGHT: 69 IN | SYSTOLIC BLOOD PRESSURE: 132 MMHG | WEIGHT: 212.7 LBS | TEMPERATURE: 98 F | BODY MASS INDEX: 31.5 KG/M2

## 2021-12-20 DIAGNOSIS — R03.0 PREHYPERTENSION: ICD-10-CM

## 2021-12-20 DIAGNOSIS — Z00.00 ROUTINE GENERAL MEDICAL EXAMINATION AT A HEALTH CARE FACILITY: Primary | ICD-10-CM

## 2021-12-20 DIAGNOSIS — Z13.220 LIPID SCREENING: ICD-10-CM

## 2021-12-20 DIAGNOSIS — Z13.1 DIABETES MELLITUS SCREENING: ICD-10-CM

## 2021-12-20 DIAGNOSIS — R53.83 FATIGUE, UNSPECIFIED TYPE: ICD-10-CM

## 2021-12-20 LAB
FASTING STATUS PATIENT QL REPORTED: YES
GLUCOSE BLD-MCNC: 99 MG/DL (ref 70–99)
TSH SERPL DL<=0.005 MIU/L-ACNC: 1.43 MU/L (ref 0.4–4)

## 2021-12-20 PROCEDURE — 36415 COLL VENOUS BLD VENIPUNCTURE: CPT | Performed by: FAMILY MEDICINE

## 2021-12-20 PROCEDURE — 82947 ASSAY GLUCOSE BLOOD QUANT: CPT | Performed by: FAMILY MEDICINE

## 2021-12-20 PROCEDURE — 99395 PREV VISIT EST AGE 18-39: CPT | Performed by: FAMILY MEDICINE

## 2021-12-20 PROCEDURE — 80061 LIPID PANEL: CPT | Performed by: FAMILY MEDICINE

## 2021-12-20 PROCEDURE — 84443 ASSAY THYROID STIM HORMONE: CPT | Performed by: FAMILY MEDICINE

## 2021-12-20 ASSESSMENT — ENCOUNTER SYMPTOMS
HEMATURIA: 0
SHORTNESS OF BREATH: 0
NERVOUS/ANXIOUS: 1
HEADACHES: 0
PARESTHESIAS: 0
CONSTIPATION: 0
DIARRHEA: 0
DIZZINESS: 0
FREQUENCY: 0
DYSURIA: 0
EYE PAIN: 0
PALPITATIONS: 0
ARTHRALGIAS: 0
ABDOMINAL PAIN: 0
JOINT SWELLING: 0
CHILLS: 0
NAUSEA: 0
FEVER: 0
SORE THROAT: 0
WEAKNESS: 0
HEARTBURN: 0
HEMATOCHEZIA: 0
MYALGIAS: 0
COUGH: 0

## 2021-12-20 ASSESSMENT — MIFFLIN-ST. JEOR: SCORE: 1901.76

## 2021-12-21 LAB
CHOLEST SERPL-MCNC: 193 MG/DL
FASTING STATUS PATIENT QL REPORTED: YES
HDLC SERPL-MCNC: 61 MG/DL
LDLC SERPL CALC-MCNC: 119 MG/DL
NONHDLC SERPL-MCNC: 132 MG/DL
TRIGL SERPL-MCNC: 66 MG/DL

## 2022-04-01 ENCOUNTER — VIRTUAL VISIT (OUTPATIENT)
Dept: FAMILY MEDICINE | Facility: CLINIC | Age: 32
End: 2022-04-01
Payer: COMMERCIAL

## 2022-04-01 ENCOUNTER — ALLIED HEALTH/NURSE VISIT (OUTPATIENT)
Dept: FAMILY MEDICINE | Facility: CLINIC | Age: 32
End: 2022-04-01
Payer: COMMERCIAL

## 2022-04-01 DIAGNOSIS — Z20.2 STD EXPOSURE: Primary | ICD-10-CM

## 2022-04-01 DIAGNOSIS — A54.5 GONOCOCCAL INFECTION OF PHARYNX: Primary | ICD-10-CM

## 2022-04-01 PROCEDURE — 99207 PR NO CHARGE NURSE ONLY: CPT

## 2022-04-01 PROCEDURE — 96372 THER/PROPH/DIAG INJ SC/IM: CPT | Performed by: INTERNAL MEDICINE

## 2022-04-01 PROCEDURE — 99213 OFFICE O/P EST LOW 20 MIN: CPT | Mod: 95 | Performed by: INTERNAL MEDICINE

## 2022-04-01 ASSESSMENT — ENCOUNTER SYMPTOMS
COUGH: 1
DYSURIA: 0
DIARRHEA: 0
CHILLS: 0
FEVER: 0
SORE THROAT: 0

## 2022-04-01 NOTE — PROGRESS NOTES
Julian is a 31 year old who is being evaluated via a billable video visit.      How would you like to obtain your AVS? MyChart  If the video visit is dropped, the invitation should be resent by: text  Will anyone else be joining your video visit? No      Video Start Time: 0920    Assessment & Plan     Gonococcal infection of pharynx  Patient with an asymptomatic positive pharyngeal gonorrhea result and negative GC.  He is on HIV preexposure prophylaxis and has routine monitoring.  Has never had an STD.  No symptoms.  - cefTRIAXone (ROCEPHIN) injection 500 mg             One week withG C and Chlamydia    Return in about 1 week (around 4/8/2022) for Follow up.    Bashir Su MD  [facility]    Subjective   Julian is a 31 year old who presents for the following health issues     HPI   Patient had a positive pharyngeal GC resolved on routine monitoring.  He feels well and has no complaints.  Is on preexposure prophylaxis has regular monitoring through his online provider.  We discussed the implications of the results and the need for follow-up and follow-up testing.  He is not located in my area will need to get 500 mg of I am a Rocephin.  He will need follow-up for test of cure in a week.        Review of Systems   Constitutional: Negative for chills and fever.   HENT: Negative for sore throat.    Respiratory: Positive for cough.    Gastrointestinal: Negative for diarrhea.   Genitourinary: Negative for dysuria.            Objective           Vitals:  No vitals were obtained today due to virtual visit.    Physical Exam   GENERAL: Healthy, alert and no distress  EYES: Eyes grossly normal to inspection.  No discharge or erythema, or obvious scleral/conjunctival abnormalities.  RESP: No audible wheeze, cough, or visible cyanosis.  No visible retractions or increased work of breathing.    SKIN: Visible skin clear. No significant rash, abnormal pigmentation or lesions.  NEURO: Cranial nerves  grossly intact.  Mentation and speech appropriate for age.  PSYCH: Mentation appears normal, affect normal/bright, judgement and insight intact, normal speech and appearance well-groomed.                Video-Visit Details    Type of service:  Video Visit    Video End Time:0935    Originating Location (pt. Location): Home    Distant Location (provider location):  [facility]     Platform used for Video Visit: Claire

## 2022-04-01 NOTE — NURSING NOTE
Clinic Administered Medication Documentation          Injectable Medication Documentation    Patient was given Ceftriaxone Sodium (Rocephin). Prior to medication administration, verified patients identity using patient s name and date of birth. Please see MAR and medication order for additional information. Patient instructed to remain in clinic for 15 minutes.      Was entire vial of medication used? Yes  Vial/Syringe: Single dose vial  Expiration Date:  MAy 2024  Was this medication supplied by the patient? No

## 2022-04-01 NOTE — PROGRESS NOTES
{PROVIDER CHARTING PREFERENCE:050493}    Subjective   Julian is a 31 year old who presents for the following health issues {ACCOMPANIED BY STATEMENT (Optional):478104}    HPI     {SUPERLIST (Optional):578351}  {additonal problems for provider to add (Optional):486139}    Review of Systems   {ROS COMP (Optional):824732}      Objective    There were no vitals taken for this visit.  There is no height or weight on file to calculate BMI.  Physical Exam   {Exam List (Optional):699187}    {Diagnostic Test Results (Optional):410002}    {AMBULATORY ATTESTATION (Optional):181536}

## 2022-04-01 NOTE — PROGRESS NOTES
{PROVIDER CHARTING PREFERENCE:548959}    Subjective   Julian is a 31 year old who presents for the following health issues {ACCOMPANIED BY STATEMENT (Optional):863278}    HPI     {SUPERLIST (Optional):764021}  {additonal problems for provider to add (Optional):208993}    Review of Systems   {ROS COMP (Optional):085096}      Objective    There were no vitals taken for this visit.  There is no height or weight on file to calculate BMI.  Physical Exam   {Exam List (Optional):287200}    {Diagnostic Test Results (Optional):714714}    {AMBULATORY ATTESTATION (Optional):336075}

## 2022-09-10 ENCOUNTER — HEALTH MAINTENANCE LETTER (OUTPATIENT)
Age: 32
End: 2022-09-10

## 2023-01-22 ENCOUNTER — HEALTH MAINTENANCE LETTER (OUTPATIENT)
Age: 33
End: 2023-01-22

## 2023-05-16 ENCOUNTER — OFFICE VISIT (OUTPATIENT)
Dept: FAMILY MEDICINE | Facility: CLINIC | Age: 33
End: 2023-05-16
Payer: COMMERCIAL

## 2023-05-16 VITALS
RESPIRATION RATE: 18 BRPM | OXYGEN SATURATION: 96 % | HEIGHT: 70 IN | HEART RATE: 81 BPM | WEIGHT: 226 LBS | TEMPERATURE: 96.7 F | DIASTOLIC BLOOD PRESSURE: 72 MMHG | BODY MASS INDEX: 32.35 KG/M2 | SYSTOLIC BLOOD PRESSURE: 136 MMHG

## 2023-05-16 DIAGNOSIS — R05.1 ACUTE COUGH: ICD-10-CM

## 2023-05-16 DIAGNOSIS — J06.9 VIRAL UPPER RESPIRATORY TRACT INFECTION: Primary | ICD-10-CM

## 2023-05-16 DIAGNOSIS — J02.9 SORE THROAT: ICD-10-CM

## 2023-05-16 LAB
DEPRECATED S PYO AG THROAT QL EIA: NEGATIVE
GROUP A STREP BY PCR: NOT DETECTED

## 2023-05-16 PROCEDURE — 87651 STREP A DNA AMP PROBE: CPT | Performed by: FAMILY MEDICINE

## 2023-05-16 PROCEDURE — 99213 OFFICE O/P EST LOW 20 MIN: CPT | Performed by: FAMILY MEDICINE

## 2023-05-16 RX ORDER — FLUTICASONE PROPIONATE 50 MCG
1 SPRAY, SUSPENSION (ML) NASAL DAILY
Qty: 9.9 ML | Refills: 0 | Status: SHIPPED | OUTPATIENT
Start: 2023-05-16 | End: 2023-06-11

## 2023-05-16 RX ORDER — LORATADINE 10 MG/1
10 TABLET ORAL DAILY
Qty: 10 TABLET | Refills: 0 | Status: SHIPPED | OUTPATIENT
Start: 2023-05-16 | End: 2023-05-26

## 2023-05-16 ASSESSMENT — PAIN SCALES - GENERAL: PAINLEVEL: EXTREME PAIN (8)

## 2023-05-16 ASSESSMENT — ENCOUNTER SYMPTOMS
COUGH: 1
SORE THROAT: 1

## 2023-05-16 NOTE — PROGRESS NOTES
Assessment & Plan     Viral upper respiratory tract infection  Acute cough  Sore throat  -Lung exam normal. No concerns for pneumonia at this time.  -Strep test negative. Likely viral URI  -Post-nasal drip likely causing cough and sensation of chest congestion. Discussed using antihistamine and Flonase daily for next 7-10 days.   -Advised clinic or  visit if worsening symptoms, fever, SOB  - loratadine (CLARITIN) 10 MG tablet  Dispense: 10 tablet; Refill: 0  - fluticasone (FLONASE) 50 MCG/ACT nasal spray  Dispense: 9.9 mL; Refill: 0  - Streptococcus A Rapid Screen w/Reflex to PCR - Clinic Collect  - Group A Streptococcus PCR Throat Swab        Herbie ArelyDO  [facility]    Marquez Jordan is a 32 year old, presenting for the following health issues:  Pharyngitis and Cough        5/16/2023    10:58 AM   Additional Questions   Roomed by Juanjo ORTEGA RN         5/16/2023    10:58 AM   Patient Reported Additional Medications   Patient reports taking the following new medications Mucinex     Here today for cough, chest congestion, and sore throat for 1 week.  Has taken mucinex.  Tested for covid multiple times and was negative.  Denies fever, chills, SOB.    Pharyngitis   Associated symptoms include congestion and cough.   Cough  Associated symptoms include chest pain and sore throat.   History of Present Illness       Reason for visit:  Sore throat and chest pain for a week now  covid tests have been negative  Symptom onset:  3-7 days ago    He eats 2-3 servings of fruits and vegetables daily.He consumes 1 sweetened beverage(s) daily.He exercises with enough effort to increase his heart rate 60 or more minutes per day.  He exercises with enough effort to increase his heart rate 6 days per week.   He is taking medications regularly.               Review of Systems   HENT: Positive for congestion and sore throat.    Respiratory: Positive for cough.    Cardiovascular: Positive for chest pain.  "           Objective    /72 (BP Location: Right arm, Patient Position: Sitting, Cuff Size: Adult Large)   Pulse 81   Temp (!) 96.7  F (35.9  C) (Temporal)   Resp 18   Ht 1.765 m (5' 9.5\")   Wt 102.5 kg (226 lb)   SpO2 96%   BMI 32.90 kg/m    Body mass index is 32.9 kg/m .  Physical Exam   GENERAL: healthy, alert and no distress  HENT: ear canals and TM's normal, nose and mouth without ulcers or lesions  NECK: no adenopathy, no asymmetry, masses, or scars and thyroid normal to palpation  RESP: lungs clear to auscultation - no rales, rhonchi or wheezes  CV: regular rate and rhythm, normal S1 S2, no S3 or S4, no murmur, click or rub, no peripheral edema and peripheral pulses strong  SKIN: no suspicious lesions or rashes  PSYCH: mentation appears normal, affect normal/bright                    "

## 2023-06-08 DIAGNOSIS — J02.9 SORE THROAT: ICD-10-CM

## 2023-06-11 NOTE — TELEPHONE ENCOUNTER
Routing refill request to provider for review/approval because:  --Flonase is not active in chart.  --Last ordered in acute care visit with Arely 5/16/23.  --Last chronic care visit 12/20/21 at [facility].    ,  --Please contact patient and ask to schedule an annual preventive/physical and if possible choose a primary clinic/provider.    --This request has been routed to provider to authorize.                 --Last visit:  5/16/2023 Arely for acute care URI.    --Future Visit: none.    --Last Written Prescription:     Disp Refills Start End SERGIO   fluticasone (FLONASE) 50 MCG/ACT nasal spray 9.9 mL 0 5/16/2023 5/26/2023 --   Sig - Route: Spray 1 spray into both nostrils daily for 10 days - Both Nostrils

## 2023-06-12 RX ORDER — FLUTICASONE PROPIONATE 50 MCG
1 SPRAY, SUSPENSION (ML) NASAL DAILY
Qty: 9.9 ML | Refills: 0 | Status: SHIPPED | OUTPATIENT
Start: 2023-06-12 | End: 2023-07-09

## 2023-07-07 ENCOUNTER — OFFICE VISIT (OUTPATIENT)
Dept: FAMILY MEDICINE | Facility: CLINIC | Age: 33
End: 2023-07-07
Payer: COMMERCIAL

## 2023-07-07 VITALS
TEMPERATURE: 97.8 F | SYSTOLIC BLOOD PRESSURE: 142 MMHG | DIASTOLIC BLOOD PRESSURE: 92 MMHG | OXYGEN SATURATION: 97 % | HEART RATE: 72 BPM

## 2023-07-07 DIAGNOSIS — J01.90 ACUTE SINUSITIS WITH COEXISTING CONDITION REQUIRING PROPHYLACTIC TREATMENT: Primary | ICD-10-CM

## 2023-07-07 PROCEDURE — 99213 OFFICE O/P EST LOW 20 MIN: CPT

## 2023-07-07 NOTE — PROGRESS NOTES
Assessment & Plan     Acute sinusitis with coexisting condition requiring prophylactic treatment    - amoxicillin-clavulanate (AUGMENTIN) 875-125 MG tablet  Dispense: 20 tablet; Refill: 0       Patient Instructions   Take antibiotics as directed. Increase fluids. Guiafenisen or mucinex to thin secretions. Warm steamy shower. Nasal saline drops.      If symptoms worsen be reseen.      Return in about 1 week (around 7/14/2023), or if symptoms worsen or fail to improve.    [facility]    Marquez Jordan is a 32 year old male who presents to clinic today for the following health issues:  Chief Complaint   Patient presents with     Urgent Care     Sinus Problem     Sinus pain/pressure since Sunday. Tx- mucinex, and flonase. Sx- pressure and pain on the left side     Patient reports that he has pain just above his left eye and left forehead around brow line. He has had some orange/dark brown nasal drainage. No fever/chills/sweats. Cough with some production of phelgm - no color. No difficulty breathing through nose. History of sinus surgery 6 or 7 years ago. He has narrow sinuses. Has drainage down back of throat.         Review of Systems        Objective    BP (!) 142/92   Pulse 72   Temp 97.8  F (36.6  C) (Tympanic)   SpO2 97%   Physical Exam  Vitals and nursing note reviewed.   Constitutional:       Appearance: Normal appearance. He is normal weight.   HENT:      Head: Normocephalic and atraumatic.      Right Ear: Tympanic membrane, ear canal and external ear normal.      Left Ear: Tympanic membrane, ear canal and external ear normal.      Nose: Nose normal.      Comments: Tender on palpation left forehead just above eye.     Mouth/Throat:      Mouth: Mucous membranes are moist.      Pharynx: No oropharyngeal exudate or posterior oropharyngeal erythema.      Comments: Drainage down back of throat.  Eyes:       Conjunctiva/sclera: Conjunctivae normal.      Pupils: Pupils are equal, round, and reactive to light.   Cardiovascular:      Rate and Rhythm: Normal rate and regular rhythm.      Heart sounds: Normal heart sounds.   Pulmonary:      Effort: Pulmonary effort is normal.      Breath sounds: Normal breath sounds. No wheezing, rhonchi or rales.   Musculoskeletal:      Cervical back: Neck supple. No tenderness.   Lymphadenopathy:      Cervical: No cervical adenopathy.   Skin:     General: Skin is warm and dry.   Neurological:      General: No focal deficit present.      Mental Status: He is alert and oriented to person, place, and time.   Psychiatric:         Mood and Affect: Mood normal.         Behavior: Behavior normal.         Thought Content: Thought content normal.         Judgment: Judgment normal.

## 2023-07-07 NOTE — PATIENT INSTRUCTIONS
Take antibiotics as directed. Increase fluids. Guiafenisen or mucinex to thin secretions. Warm steamy shower. Nasal saline drops.      If symptoms worsen be reseen.

## 2023-07-09 DIAGNOSIS — J02.9 SORE THROAT: ICD-10-CM

## 2023-07-09 RX ORDER — FLUTICASONE PROPIONATE 50 MCG
SPRAY, SUSPENSION (ML) NASAL
Qty: 9.9 ML | Refills: 0 | Status: SHIPPED | OUTPATIENT
Start: 2023-07-09 | End: 2023-07-27

## 2023-07-10 NOTE — TELEPHONE ENCOUNTER
"Last Written Prescription Date:  6/12/23  Last Fill Quantity: 9.9,  # refills: 0   Last office visit provider:  5/16/23     Requested Prescriptions   Pending Prescriptions Disp Refills     fluticasone (FLONASE) 50 MCG/ACT nasal spray [Pharmacy Med Name: FLUTICASONE PROP 50 MCG SPRAY] 16 mL      Sig: INSTILL 1 SPRAY INTO BOTH NOSTRILS DAILY       Nasal Allergy Protocol Passed - 7/9/2023 10:30 AM        Passed - Patient is age 12 or older        Passed - Recent (12 mo) or future (30 days) visit within the authorizing provider's specialty     Patient has had an office visit with the authorizing provider or a provider within the authorizing providers department within the previous 12 mos or has a future within next 30 days. See \"Patient Info\" tab in inbasket, or \"Choose Columns\" in Meds & Orders section of the refill encounter.              Passed - Medication is active on med list             Fernanda Velazco RN 07/09/23 7:50 PM  "

## 2023-07-23 DIAGNOSIS — J02.9 SORE THROAT: ICD-10-CM

## 2023-07-27 RX ORDER — FLUTICASONE PROPIONATE 50 MCG
SPRAY, SUSPENSION (ML) NASAL
Qty: 9.9 ML | Refills: 0 | Status: SHIPPED | OUTPATIENT
Start: 2023-07-27 | End: 2023-08-11

## 2023-07-27 NOTE — TELEPHONE ENCOUNTER
Prescription approved per [facility] Refill Protocol.    Fernanda Mart, BSN RN  [facility]

## 2023-08-10 DIAGNOSIS — J02.9 SORE THROAT: ICD-10-CM

## 2023-08-11 RX ORDER — FLUTICASONE PROPIONATE 50 MCG
SPRAY, SUSPENSION (ML) NASAL
Qty: 9.9 ML | Refills: 0 | Status: SHIPPED | OUTPATIENT
Start: 2023-08-11 | End: 2024-05-24

## 2023-08-11 NOTE — TELEPHONE ENCOUNTER
Prescription approved per [facility] Refill Protocol.    Fernanda Mart, BSN RN  [facility]

## 2023-09-07 ENCOUNTER — OFFICE VISIT (OUTPATIENT)
Dept: URGENT CARE | Facility: URGENT CARE | Age: 33
End: 2023-09-07
Payer: COMMERCIAL

## 2023-09-07 VITALS
HEART RATE: 76 BPM | OXYGEN SATURATION: 98 % | SYSTOLIC BLOOD PRESSURE: 138 MMHG | DIASTOLIC BLOOD PRESSURE: 82 MMHG | WEIGHT: 226 LBS | BODY MASS INDEX: 32.9 KG/M2 | TEMPERATURE: 97.8 F

## 2023-09-07 DIAGNOSIS — R30.0 DYSURIA: ICD-10-CM

## 2023-09-07 DIAGNOSIS — Z11.3 SCREEN FOR STD (SEXUALLY TRANSMITTED DISEASE): Primary | ICD-10-CM

## 2023-09-07 LAB
ALBUMIN UR-MCNC: NEGATIVE MG/DL
APPEARANCE UR: CLEAR
BILIRUB UR QL STRIP: NEGATIVE
C TRACH DNA SPEC QL NAA+PROBE: NEGATIVE
COLOR UR AUTO: YELLOW
GLUCOSE UR STRIP-MCNC: NEGATIVE MG/DL
HGB UR QL STRIP: NEGATIVE
KETONES UR STRIP-MCNC: NEGATIVE MG/DL
LEUKOCYTE ESTERASE UR QL STRIP: NEGATIVE
N GONORRHOEA DNA SPEC QL NAA+PROBE: NEGATIVE
NITRATE UR QL: NEGATIVE
PH UR STRIP: 5.5 [PH] (ref 5–7)
SP GR UR STRIP: 1.02 (ref 1–1.03)
UROBILINOGEN UR STRIP-ACNC: 0.2 E.U./DL

## 2023-09-07 PROCEDURE — 81003 URINALYSIS AUTO W/O SCOPE: CPT | Performed by: FAMILY MEDICINE

## 2023-09-07 PROCEDURE — 87491 CHLMYD TRACH DNA AMP PROBE: CPT | Performed by: FAMILY MEDICINE

## 2023-09-07 PROCEDURE — 87591 N.GONORRHOEAE DNA AMP PROB: CPT | Performed by: FAMILY MEDICINE

## 2023-09-07 PROCEDURE — 99213 OFFICE O/P EST LOW 20 MIN: CPT | Performed by: FAMILY MEDICINE

## 2023-09-07 NOTE — PROGRESS NOTES
Assessment & Plan     Screen for STD (sexually transmitted disease)  - NEISSERIA GONORRHOEA PCR  - CHLAMYDIA TRACHOMATIS PCR    Dysuria  - UA Macroscopic with reflex to Microscopic and Culture - Clinic Collect     symptoms and urinalysis suggest against urinary tract infection. GC/CT testing pending. Patient has Mychart active. Team will communicate with patient only if a positive result returns.     Neel Hernandez MD   [facility]    Marquez Jordan is a 33 year old male who presents to clinic today for the following health issues:  Chief Complaint   Patient presents with    Urgent Care    UTI     C/O dysuria for 1 week     HPI    No known positive exposures -- reports discomfort with urination over the last week. No hx of lower GI/penile STDs. No rash is present     1.5 years ago positive for gonorrhea on pharyngeal swab.  Patient is on PREP therapy     Patient Active Problem List    Diagnosis Date Noted    Prehypertension 03/17/2014     Priority: Medium       Current Outpatient Medications   Medication    emtricitabine-tenofovir AF (DESCOVY) 200-25 MG per tablet    fluticasone (FLONASE) 50 MCG/ACT nasal spray     No current facility-administered medications for this visit.           Objective    /82   Pulse 76   Temp 97.8  F (36.6  C) (Tympanic)   Wt 102.5 kg (226 lb)   SpO2 98%   BMI 32.90 kg/m    Physical Exam         Results for orders placed or performed in visit on 09/07/23   UA Macroscopic with reflex to Microscopic and Culture - Clinic Collect     Status: Normal    Specimen: Urine, Clean Catch   Result Value Ref Range    Color Urine Yellow Colorless, Straw, Light Yellow, Yellow    Appearance Urine Clear Clear    Glucose Urine Negative Negative mg/dL    Bilirubin Urine Negative Negative    Ketones Urine Negative Negative mg/dL    Specific Gravity Urine 1.025 1.003 - 1.035    Blood Urine Negative Negative    pH Urine 5.5 5.0 - 7.0    Protein Albumin Urine Negative Negative mg/dL     Urobilinogen Urine 0.2 0.2, 1.0 E.U./dL    Nitrite Urine Negative Negative    Leukocyte Esterase Urine Negative Negative    Narrative    Microscopic not indicated                     The use of Dragon/HomeCon dictation services may have been used to construct the content in this note; any grammatical or spelling errors are non-intentional. Please contact the author of this note directly if you are in need of any clarification.

## 2023-10-28 ENCOUNTER — OFFICE VISIT (OUTPATIENT)
Dept: FAMILY MEDICINE | Facility: CLINIC | Age: 33
End: 2023-10-28
Payer: COMMERCIAL

## 2023-10-28 VITALS
SYSTOLIC BLOOD PRESSURE: 130 MMHG | DIASTOLIC BLOOD PRESSURE: 84 MMHG | TEMPERATURE: 97.3 F | OXYGEN SATURATION: 99 % | HEART RATE: 80 BPM

## 2023-10-28 DIAGNOSIS — H00.014 HORDEOLUM EXTERNUM OF LEFT UPPER EYELID: Primary | ICD-10-CM

## 2023-10-28 PROCEDURE — 99213 OFFICE O/P EST LOW 20 MIN: CPT

## 2023-10-28 RX ORDER — ERYTHROMYCIN 5 MG/G
0.5 OINTMENT OPHTHALMIC 4 TIMES DAILY
Qty: 3.5 G | Refills: 0 | Status: SHIPPED | OUTPATIENT
Start: 2023-10-28 | End: 2023-11-04

## 2023-10-28 NOTE — PROGRESS NOTES
URGENT CARE    ASSESSMENT AND PLAN:      ICD-10-CM    1. Hordeolum externum of left upper eyelid  H00.014 erythromycin (ROMYCIN) 5 MG/GM ophthalmic ointment          Physical examination looks to be consistent with hordeolum externum and will treat with erythromycin ointment for the next 5 to 7 days.  Hand hygiene and supportive measures printed off and discussed.        Red flag symptoms needing urgent evaluation was discussed with patient.    Follow up with primary care provider with any problems, questions or concerns or if symptoms worsen or fail to improve.  Patient verbalized understanding and is agreeable to plan. The patient was discharged ambulatory and in stable condition.    Chief Complaint   Patient presents with    Urgent Care     Possible infection on his left eye. Hx of stye. Used otc eye drops and took antihistamine      SUBJECTIVE:  Julian Arroyo is a 33 year old male who presents complaining of moderate eyelid redness, swelling and lump noted in left eye for 7 day(s).   Associated Signs and Symptoms: NONE  Treatment measures tried include: warm packs, NSAIDs, and OTC eye drops   Contact wearer: Yes    Past Medical History:   Diagnosis Date    Hypertension 2014     emtricitabine-tenofovir AF (DESCOVY) 200-25 MG per tablet, Take 1 tablet by mouth daily (Patient not taking: Reported on 9/7/2023)  fluticasone (FLONASE) 50 MCG/ACT nasal spray, SPRAY 1 SPRAY INTO EACH NOSTRIL EVERY DAY (Patient not taking: Reported on 9/7/2023)    No current facility-administered medications on file prior to visit.    Social History     Tobacco Use    Smoking status: Never     Passive exposure: Never    Smokeless tobacco: Never   Substance Use Topics    Alcohol use: Yes     Alcohol/week: 0.0 standard drinks of alcohol     No Known Allergies    Review of Systems  All systems reviewed and negative except per HPI.    OBJECTIVE:  /84   Pulse 80   Temp 97.3  F (36.3  C) (Tympanic)   SpO2 99%     Physical  Exam  GENERAL: healthy, alert and no distress  EYES: R eyes grossly normal to inspection, PERRL and conjunctivae and sclerae normal.  Let upper eyelid with palpable lump and erythematous.  Conjunctive and sclera are normal without drainage identified.  PERRL.   HENT: ear canals and TM's normal, nose and mouth without ulcers or lesions  NECK: no adenopathy, no asymmetry, masses, or scars   PSYCH: mentation appears normal, affect normal/bright

## 2024-02-18 ENCOUNTER — HEALTH MAINTENANCE LETTER (OUTPATIENT)
Age: 34
End: 2024-02-18

## 2024-05-14 ENCOUNTER — OFFICE VISIT (OUTPATIENT)
Dept: URGENT CARE | Facility: URGENT CARE | Age: 34
End: 2024-05-14
Payer: COMMERCIAL

## 2024-05-14 ENCOUNTER — HOSPITAL ENCOUNTER (EMERGENCY)
Facility: CLINIC | Age: 34
Discharge: HOME | End: 2024-05-14
Attending: EMERGENCY MEDICINE | Admitting: EMERGENCY MEDICINE
Payer: COMMERCIAL

## 2024-05-14 ENCOUNTER — APPOINTMENT (OUTPATIENT)
Dept: CT IMAGING | Facility: CLINIC | Age: 34
End: 2024-05-14
Attending: PHYSICIAN ASSISTANT
Payer: COMMERCIAL

## 2024-05-14 VITALS
BODY MASS INDEX: 32.07 KG/M2 | SYSTOLIC BLOOD PRESSURE: 165 MMHG | DIASTOLIC BLOOD PRESSURE: 102 MMHG | HEIGHT: 70 IN | HEART RATE: 93 BPM | WEIGHT: 224 LBS | RESPIRATION RATE: 15 BRPM | TEMPERATURE: 99 F | OXYGEN SATURATION: 97 %

## 2024-05-14 VITALS
TEMPERATURE: 98.6 F | HEART RATE: 90 BPM | OXYGEN SATURATION: 98 % | RESPIRATION RATE: 16 BRPM | DIASTOLIC BLOOD PRESSURE: 106 MMHG | SYSTOLIC BLOOD PRESSURE: 170 MMHG

## 2024-05-14 DIAGNOSIS — R51.9 NONINTRACTABLE HEADACHE, UNSPECIFIED CHRONICITY PATTERN, UNSPECIFIED HEADACHE TYPE: ICD-10-CM

## 2024-05-14 DIAGNOSIS — D72.829 LEUKOCYTOSIS, UNSPECIFIED TYPE: ICD-10-CM

## 2024-05-14 DIAGNOSIS — R09.81 NASAL CONGESTION: ICD-10-CM

## 2024-05-14 DIAGNOSIS — J03.90 TONSILLITIS: ICD-10-CM

## 2024-05-14 DIAGNOSIS — J34.1 MAXILLARY SINUS CYST: ICD-10-CM

## 2024-05-14 DIAGNOSIS — R03.0 ELEVATED BP WITHOUT DIAGNOSIS OF HYPERTENSION: ICD-10-CM

## 2024-05-14 DIAGNOSIS — R22.1 THROAT SWELLING: ICD-10-CM

## 2024-05-14 DIAGNOSIS — R07.0 THROAT PAIN: Primary | ICD-10-CM

## 2024-05-14 LAB
ANION GAP SERPL CALCULATED.3IONS-SCNC: 12 MMOL/L (ref 7–15)
BASOPHILS # BLD AUTO: 0 10E3/UL (ref 0–0.2)
BASOPHILS # BLD AUTO: 0.1 10E3/UL (ref 0–0.2)
BASOPHILS NFR BLD AUTO: 0 %
BASOPHILS NFR BLD AUTO: 0 %
BUN SERPL-MCNC: 11.3 MG/DL (ref 6–20)
CALCIUM SERPL-MCNC: 9.9 MG/DL (ref 8.6–10)
CHLORIDE SERPL-SCNC: 101 MMOL/L (ref 98–107)
CREAT SERPL-MCNC: 0.96 MG/DL (ref 0.67–1.17)
DEPRECATED HCO3 PLAS-SCNC: 25 MMOL/L (ref 22–29)
DEPRECATED S PYO AG THROAT QL EIA: NEGATIVE
EGFRCR SERPLBLD CKD-EPI 2021: >90 ML/MIN/1.73M2
EOSINOPHIL # BLD AUTO: 0 10E3/UL (ref 0–0.7)
EOSINOPHIL # BLD AUTO: 0.1 10E3/UL (ref 0–0.7)
EOSINOPHIL NFR BLD AUTO: 0 %
EOSINOPHIL NFR BLD AUTO: 0 %
ERYTHROCYTE [DISTWIDTH] IN BLOOD BY AUTOMATED COUNT: 11.8 % (ref 10–15)
ERYTHROCYTE [DISTWIDTH] IN BLOOD BY AUTOMATED COUNT: 11.9 % (ref 10–15)
FLUAV RNA SPEC QL NAA+PROBE: NEGATIVE
FLUBV RNA RESP QL NAA+PROBE: NEGATIVE
GLUCOSE SERPL-MCNC: 106 MG/DL (ref 70–99)
GROUP A STREP BY PCR: NOT DETECTED
HCT VFR BLD AUTO: 47.7 % (ref 40–53)
HCT VFR BLD AUTO: 49.6 % (ref 40–53)
HGB BLD-MCNC: 16.5 G/DL (ref 13.3–17.7)
HGB BLD-MCNC: 17.8 G/DL (ref 13.3–17.7)
IMM GRANULOCYTES # BLD: 0 10E3/UL
IMM GRANULOCYTES # BLD: 0.1 10E3/UL
IMM GRANULOCYTES NFR BLD: 0 %
IMM GRANULOCYTES NFR BLD: 0 %
LYMPHOCYTES # BLD AUTO: 1.3 10E3/UL (ref 0.8–5.3)
LYMPHOCYTES # BLD AUTO: 1.6 10E3/UL (ref 0.8–5.3)
LYMPHOCYTES NFR BLD AUTO: 8 %
LYMPHOCYTES NFR BLD AUTO: 9 %
MCH RBC QN AUTO: 31 PG (ref 26.5–33)
MCH RBC QN AUTO: 32.3 PG (ref 26.5–33)
MCHC RBC AUTO-ENTMCNC: 34.6 G/DL (ref 31.5–36.5)
MCHC RBC AUTO-ENTMCNC: 35.9 G/DL (ref 31.5–36.5)
MCV RBC AUTO: 90 FL (ref 78–100)
MCV RBC AUTO: 90 FL (ref 78–100)
MONOCYTES # BLD AUTO: 1.3 10E3/UL (ref 0–1.3)
MONOCYTES # BLD AUTO: 1.4 10E3/UL (ref 0–1.3)
MONOCYTES NFR BLD AUTO: 8 %
MONOCYTES NFR BLD AUTO: 8 %
MONOCYTES NFR BLD AUTO: NEGATIVE %
NEUTROPHILS # BLD AUTO: 13.9 10E3/UL (ref 1.6–8.3)
NEUTROPHILS # BLD AUTO: 13.9 10E3/UL (ref 1.6–8.3)
NEUTROPHILS NFR BLD AUTO: 82 %
NEUTROPHILS NFR BLD AUTO: 84 %
NRBC # BLD AUTO: 0 10E3/UL
NRBC BLD AUTO-RTO: 0 /100
PLATELET # BLD AUTO: 212 10E3/UL (ref 150–450)
PLATELET # BLD AUTO: 224 10E3/UL (ref 150–450)
POTASSIUM SERPL-SCNC: 4.5 MMOL/L (ref 3.4–5.3)
RBC # BLD AUTO: 5.32 10E6/UL (ref 4.4–5.9)
RBC # BLD AUTO: 5.51 10E6/UL (ref 4.4–5.9)
RSV RNA SPEC NAA+PROBE: NEGATIVE
SARS-COV-2 RNA RESP QL NAA+PROBE: NEGATIVE
SODIUM SERPL-SCNC: 138 MMOL/L (ref 135–145)
WBC # BLD AUTO: 16.6 10E3/UL (ref 4–11)
WBC # BLD AUTO: 17 10E3/UL (ref 4–11)

## 2024-05-14 PROCEDURE — 250N000011 HC RX IP 250 OP 636: Performed by: PHYSICIAN ASSISTANT

## 2024-05-14 PROCEDURE — 99285 EMERGENCY DEPT VISIT HI MDM: CPT | Mod: 25 | Performed by: EMERGENCY MEDICINE

## 2024-05-14 PROCEDURE — 96375 TX/PRO/DX INJ NEW DRUG ADDON: CPT | Performed by: EMERGENCY MEDICINE

## 2024-05-14 PROCEDURE — 99214 OFFICE O/P EST MOD 30 MIN: CPT | Performed by: FAMILY MEDICINE

## 2024-05-14 PROCEDURE — 87651 STREP A DNA AMP PROBE: CPT | Performed by: FAMILY MEDICINE

## 2024-05-14 PROCEDURE — 70491 CT SOFT TISSUE NECK W/DYE: CPT

## 2024-05-14 PROCEDURE — 250N000009 HC RX 250: Performed by: EMERGENCY MEDICINE

## 2024-05-14 PROCEDURE — 36415 COLL VENOUS BLD VENIPUNCTURE: CPT | Performed by: FAMILY MEDICINE

## 2024-05-14 PROCEDURE — 36415 COLL VENOUS BLD VENIPUNCTURE: CPT | Performed by: PHYSICIAN ASSISTANT

## 2024-05-14 PROCEDURE — 70491 CT SOFT TISSUE NECK W/DYE: CPT | Mod: 26 | Performed by: RADIOLOGY

## 2024-05-14 PROCEDURE — 87637 SARSCOV2&INF A&B&RSV AMP PRB: CPT | Performed by: EMERGENCY MEDICINE

## 2024-05-14 PROCEDURE — 250N000013 HC RX MED GY IP 250 OP 250 PS 637: Performed by: PHYSICIAN ASSISTANT

## 2024-05-14 PROCEDURE — 99284 EMERGENCY DEPT VISIT MOD MDM: CPT | Mod: FS | Performed by: EMERGENCY MEDICINE

## 2024-05-14 PROCEDURE — 250N000011 HC RX IP 250 OP 636: Performed by: EMERGENCY MEDICINE

## 2024-05-14 PROCEDURE — 85025 COMPLETE CBC W/AUTO DIFF WBC: CPT | Performed by: PHYSICIAN ASSISTANT

## 2024-05-14 PROCEDURE — 80048 BASIC METABOLIC PNL TOTAL CA: CPT | Performed by: PHYSICIAN ASSISTANT

## 2024-05-14 PROCEDURE — 85025 COMPLETE CBC W/AUTO DIFF WBC: CPT | Performed by: FAMILY MEDICINE

## 2024-05-14 PROCEDURE — 86308 HETEROPHILE ANTIBODY SCREEN: CPT | Performed by: FAMILY MEDICINE

## 2024-05-14 PROCEDURE — 96374 THER/PROPH/DIAG INJ IV PUSH: CPT | Mod: 59 | Performed by: EMERGENCY MEDICINE

## 2024-05-14 RX ORDER — IOPAMIDOL 755 MG/ML
90 INJECTION, SOLUTION INTRAVASCULAR ONCE
Status: COMPLETED | OUTPATIENT
Start: 2024-05-14 | End: 2024-05-14

## 2024-05-14 RX ORDER — DEXAMETHASONE SODIUM PHOSPHATE 10 MG/ML
10 INJECTION, SOLUTION INTRAMUSCULAR; INTRAVENOUS ONCE
Status: COMPLETED | OUTPATIENT
Start: 2024-05-14 | End: 2024-05-14

## 2024-05-14 RX ORDER — KETOROLAC TROMETHAMINE 15 MG/ML
15 INJECTION, SOLUTION INTRAMUSCULAR; INTRAVENOUS ONCE
Status: COMPLETED | OUTPATIENT
Start: 2024-05-14 | End: 2024-05-14

## 2024-05-14 RX ADMIN — KETOROLAC TROMETHAMINE 15 MG: 15 INJECTION INTRAMUSCULAR; INTRAVENOUS at 12:45

## 2024-05-14 RX ADMIN — DEXAMETHASONE SODIUM PHOSPHATE 10 MG: 10 INJECTION, SOLUTION INTRAMUSCULAR; INTRAVENOUS at 16:32

## 2024-05-14 RX ADMIN — AMOXICILLIN AND CLAVULANATE POTASSIUM 1 TABLET: 875; 125 TABLET, FILM COATED ORAL at 16:32

## 2024-05-14 RX ADMIN — IOPAMIDOL 90 ML: 755 INJECTION, SOLUTION INTRAVENOUS at 15:27

## 2024-05-14 RX ADMIN — SODIUM CHLORIDE, PRESERVATIVE FREE 60 ML: 5 INJECTION INTRAVENOUS at 15:27

## 2024-05-14 ASSESSMENT — COLUMBIA-SUICIDE SEVERITY RATING SCALE - C-SSRS
1. IN THE PAST MONTH, HAVE YOU WISHED YOU WERE DEAD OR WISHED YOU COULD GO TO SLEEP AND NOT WAKE UP?: NO
2. HAVE YOU ACTUALLY HAD ANY THOUGHTS OF KILLING YOURSELF IN THE PAST MONTH?: NO
6. HAVE YOU EVER DONE ANYTHING, STARTED TO DO ANYTHING, OR PREPARED TO DO ANYTHING TO END YOUR LIFE?: NO

## 2024-05-14 ASSESSMENT — ACTIVITIES OF DAILY LIVING (ADL)
ADLS_ACUITY_SCORE: 35

## 2024-05-14 ASSESSMENT — PAIN SCALES - GENERAL: PAINLEVEL: EXTREME PAIN (8)

## 2024-05-14 NOTE — ED PROVIDER NOTES
ED Provider Note  [facility]      History     Chief Complaint   Patient presents with    Pharyngitis     HPI  Julian Arroyo is a 33 year old male who presents the emergency department concerns for sore throat.  Patient presents after being evaluated at the [facility] urgent care.    He notes over the last 2 weeks has been dealing with ongoing sore throat.  Initially this was more mild and he states his symptoms have progressed over the last 2 days with more severe sore throat symptoms.  He notes associated headache as well with this.  He is able to tolerate orals with this including liquids.  No change in voice.  Has not had any fevers.  He has had some nasal congestion with the symptoms above.  No cough no dyspnea no chest pain no vomiting no vision changes.  He is not anticoagulated no head injury.  No history of significant throat problems he has a history of prior tonsillectomy.  He also has a history of prior tonsil surgery about 10 years ago.    Patient is on PrEP, he denies any concern for HIV exposure.    Per EMR patient had workup prior to arrival significant for leukocytosis of 17, absolute neutrophil count 13.9 with negative rapid strep and Monospot screen at that time.  He states that the urgent care was concerned for possible deep space neck infection.  Neck infection which was not visualized on exam    Past Medical History  Past Medical History:   Diagnosis Date    Hypertension 2014     Past Surgical History:   Procedure Laterality Date    SINUS SURGERY      TONSILLECTOMY       amoxicillin-clavulanate (AUGMENTIN) 875-125 MG tablet  emtricitabine-tenofovir AF (DESCOVY) 200-25 MG per tablet  magic mouthwash suspension (diphenhydrAMINE, lidocaine, aluminum-magnesium & simethicone)  fluticasone (FLONASE) 50 MCG/ACT nasal spray      No Known Allergies  Family History  Family History   Problem Relation Age of Onset    Lipids Father     Alcohol/Drug Father     Hyperlipidemia  Father     Hypertension Father     Hypertension Maternal Grandfather     Thyroid Disease Maternal Grandfather      Social History   Social History     Tobacco Use    Smoking status: Never     Passive exposure: Never    Smokeless tobacco: Never   Vaping Use    Vaping status: Never Used   Substance Use Topics    Alcohol use: Yes     Alcohol/week: 0.0 standard drinks of alcohol    Drug use: No         A medically appropriate review of systems was performed with pertinent positives and negatives noted in the HPI, and all other systems negative.    Physical Exam   BP: (!) 170/106  Pulse: 90  Temp: 98.6  F (37  C)  Resp: 16  SpO2: 98 %  Physical Exam    GENERAL APPEARANCE: The patient is well developed, well appearing, and in no acute distress.  HEAD:  Normocephalic and atraumatic.   EENT: Voice normal.  Uvula midline with no exudates.  Tonsils surgically absent.  Voice normal no drooling.  TMs without erythema or bulging bilaterally.  No TMJ tenderness bilaterally.  No trismus.  Patient does have mild to moderate bilateral anterior cervical adenopathy on exam.  No submandibular swelling noted.  NECK: Trachea is midline.No lymphadenopathy or tenderness.  LUNGS: Breath sounds are equal and clear bilaterally. No wheezes, rhonchi, or rales.  HEART: Tachycardic rate and normal rhythm.    EXTREMITIES: No cyanosis, clubbing, or edema.  NEUROLOGIC: No focal sensory or motor deficits are noted.  PSYCHIATRIC: The patient is awake, alert.  Appropriate mood and affect.  SKIN: Warm, dry, and well perfused. Good turgor.      ED Course, Procedures, & Data           Results for orders placed or performed during the hospital encounter of 05/14/24   Soft tissue neck CT w contrast     Status: None    Narrative    EXAM: CT SOFT TISSUE NECK W CONTRAST  5/14/2024 3:38 PM     HISTORY:  Leukocytosis, 2 weeks of throat pain, increasing, evaluate  for deep space infection         COMPARISON:  None     TECHNIQUE: Following intravenous  administration of nonionic iodinated  contrast medium, thin section helical CT images were obtained from the  skull base down to the level of the aortic arch.  Axial, coronal and  sagittal reformations were performed with 2-3 mm slice thickness  reconstruction. Images were reviewed in soft tissue, lung and bone  windows.    CONTRAST: 90 mL IV Isovue-370    FINDINGS:   Mild enlargement and enhancement of the adenoid tonsils and posterior  nasopharyngeal mucosa (series 2 image 36). No evidence of  retropharyngeal fluid collection.    No nasopharyngeal, oropharyngeal, hypopharyngeal, or glottic mucosal  space abnormality. Normal tongue base. Salivary glands are within  normal limits.    Mild reactive-type bilateral cervical lymphadenopathy.    Normal thyroid gland.    Patent cervical vasculature; no high grade arterial stenosis.    No suspicious osseus lesion. No high grade spinal canal stenosis.    2.2 cm polypoid mucous retention cyst in the right maxillary sinus.  Mastoid air cells are clear. No periapical dental lucency. The imaged  skull base, intracranial and orbital structures are within normal  limits.    No suspicious finding in the visualized superior mediastinum/thorax.  Clear lung apices.      Impression    IMPRESSION: Mild enlargement and enhancement of the adenoid tonsils  and posterior nasopharyngeal mucosa. No evidence of retropharyngeal or  other neck soft tissue abscess.    I have personally reviewed the examination and initial interpretation  and I agree with the findings.    SUZY PALM MD         SYSTEM ID:  [id number]   Basic metabolic panel     Status: Abnormal   Result Value Ref Range    Sodium 138 135 - 145 mmol/L    Potassium 4.5 3.4 - 5.3 mmol/L    Chloride 101 98 - 107 mmol/L    Carbon Dioxide (CO2) 25 22 - 29 mmol/L    Anion Gap 12 7 - 15 mmol/L    Urea Nitrogen 11.3 6.0 - 20.0 mg/dL    Creatinine 0.96 0.67 - 1.17 mg/dL    GFR Estimate >90 >60 mL/min/1.73m2    Calcium 9.9 8.6 - 10.0 mg/dL     Glucose 106 (H) 70 - 99 mg/dL   CBC with platelets and differential     Status: Abnormal   Result Value Ref Range    WBC Count 16.6 (H) 4.0 - 11.0 10e3/uL    RBC Count 5.51 4.40 - 5.90 10e6/uL    Hemoglobin 17.8 (H) 13.3 - 17.7 g/dL    Hematocrit 49.6 40.0 - 53.0 %    MCV 90 78 - 100 fL    MCH 32.3 26.5 - 33.0 pg    MCHC 35.9 31.5 - 36.5 g/dL    RDW 11.9 10.0 - 15.0 %    Platelet Count 224 150 - 450 10e3/uL    % Neutrophils 84 %    % Lymphocytes 8 %    % Monocytes 8 %    % Eosinophils 0 %    % Basophils 0 %    % Immature Granulocytes 0 %    NRBCs per 100 WBC 0 <1 /100    Absolute Neutrophils 13.9 (H) 1.6 - 8.3 10e3/uL    Absolute Lymphocytes 1.3 0.8 - 5.3 10e3/uL    Absolute Monocytes 1.3 0.0 - 1.3 10e3/uL    Absolute Eosinophils 0.0 0.0 - 0.7 10e3/uL    Absolute Basophils 0.1 0.0 - 0.2 10e3/uL    Absolute Immature Granulocytes 0.1 <=0.4 10e3/uL    Absolute NRBCs 0.0 10e3/uL   Symptomatic Influenza A/B, RSV, & SARS-CoV2 PCR (COVID-19) Nasopharyngeal     Status: Normal    Specimen: Nasopharyngeal; Swab   Result Value Ref Range    Influenza A PCR Negative Negative    Influenza B PCR Negative Negative    RSV PCR Negative Negative    SARS CoV2 PCR Negative Negative    Narrative    Testing was performed using the Xpert Xpress CoV2/Flu/RSV Assay on the Cepheid GeneXpert Instrument. This test should be ordered for the detection of SARS-CoV-2, influenza, and RSV viruses in individuals who meet clinical and/or epidemiological criteria. Test performance is unknown in asymptomatic patients. This test is for in vitro diagnostic use under the FDA EUA for laboratories certified under CLIA to perform high or moderate complexity testing. This test has not been FDA cleared or approved. A negative result does not rule out the presence of PCR inhibitors in the specimen or target RNA in concentration below the limit of detection for the assay. If only one viral target is positive but coinfection with multiple targets is suspected,  the sample should be re-tested with another FDA cleared, approved, or authorized test, if coinfection would change clinical management. This test was validated by the [facility]. These laboratories are certified under the Clinical Laboratory Improvement Amendments of 1988 (CLIA-88) as qualified to perform high complexity laboratory testing.   CBC with platelets differential     Status: Abnormal    Narrative    The following orders were created for panel order CBC with platelets differential.  Procedure                               Abnormality         Status                     ---------                               -----------         ------                     CBC with platelets and d...[[id number]]  Abnormal            Final result                 Please view results for these tests on the individual orders.   Results for orders placed or performed in visit on 05/14/24   Mononucleosis screen     Status: Normal   Result Value Ref Range    Mononucleosis Screen Negative Negative   CBC with platelets and differential     Status: Abnormal   Result Value Ref Range    WBC Count 17.0 (H) 4.0 - 11.0 10e3/uL    RBC Count 5.32 4.40 - 5.90 10e6/uL    Hemoglobin 16.5 13.3 - 17.7 g/dL    Hematocrit 47.7 40.0 - 53.0 %    MCV 90 78 - 100 fL    MCH 31.0 26.5 - 33.0 pg    MCHC 34.6 31.5 - 36.5 g/dL    RDW 11.8 10.0 - 15.0 %    Platelet Count 212 150 - 450 10e3/uL    % Neutrophils 82 %    % Lymphocytes 9 %    % Monocytes 8 %    % Eosinophils 0 %    % Basophils 0 %    % Immature Granulocytes 0 %    Absolute Neutrophils 13.9 (H) 1.6 - 8.3 10e3/uL    Absolute Lymphocytes 1.6 0.8 - 5.3 10e3/uL    Absolute Monocytes 1.4 (H) 0.0 - 1.3 10e3/uL    Absolute Eosinophils 0.1 0.0 - 0.7 10e3/uL    Absolute Basophils 0.0 0.0 - 0.2 10e3/uL    Absolute Immature Granulocytes 0.0 <=0.4 10e3/uL   Streptococcus A Rapid Screen w/Reflex to PCR - Clinic Collect     Status: Normal    Specimen: Throat; Swab   Result Value Ref Range     Group A Strep antigen Negative Negative   Group A Streptococcus PCR Throat Swab     Status: Normal    Specimen: Throat; Swab   Result Value Ref Range    Group A strep by PCR Not Detected Not Detected    Narrative    The Xpert Xpress Strep A test, performed on the [employer], is a rapid, qualitative in vitro diagnostic test for the detection of Streptococcus pyogenes (Group A ß-hemolytic Streptococcus, Strep A) in throat swab specimens from patients with signs and symptoms of pharyngitis. The Xpert Xpress Strep A test can be used as an aid in the diagnosis of Group A Streptococcal pharyngitis. The assay is not intended to monitor treatment for Group A Streptococcus infections. The Xpert Xpress Strep A test utilizes an automated real-time polymerase chain reaction (PCR) to detect Streptococcus pyogenes DNA.   CBC with platelets and differential     Status: Abnormal    Narrative    The following orders were created for panel order CBC with platelets and differential.  Procedure                               Abnormality         Status                     ---------                               -----------         ------                     CBC with platelets and d...[[id number]]  Abnormal            Final result                 Please view results for these tests on the individual orders.     Medications   ketorolac (TORADOL) injection 15 mg (15 mg Intravenous $Given 5/14/24 1245)   CT saline (60 mLs Intravenous $Given 5/14/24 1527)   iopamidol (ISOVUE-370) solution 90 mL (90 mLs Intravenous $Given 5/14/24 1527)   amoxicillin-clavulanate (AUGMENTIN) 875-125 MG per tablet 1 tablet (1 tablet Oral $Given 5/14/24 1632)   dexAMETHasone PF (DECADRON) injection 10 mg (10 mg Intravenous $Given 5/14/24 1632)     Labs Ordered and Resulted from Time of ED Arrival to Time of ED Departure   BASIC METABOLIC PANEL - Abnormal       Result Value    Sodium 138      Potassium 4.5      Chloride 101      Carbon Dioxide  (CO2) 25      Anion Gap 12      Urea Nitrogen 11.3      Creatinine 0.96      GFR Estimate >90      Calcium 9.9      Glucose 106 (*)    CBC WITH PLATELETS AND DIFFERENTIAL - Abnormal    WBC Count 16.6 (*)     RBC Count 5.51      Hemoglobin 17.8 (*)     Hematocrit 49.6      MCV 90      MCH 32.3      MCHC 35.9      RDW 11.9      Platelet Count 224      % Neutrophils 84      % Lymphocytes 8      % Monocytes 8      % Eosinophils 0      % Basophils 0      % Immature Granulocytes 0      NRBCs per 100 WBC 0      Absolute Neutrophils 13.9 (*)     Absolute Lymphocytes 1.3      Absolute Monocytes 1.3      Absolute Eosinophils 0.0      Absolute Basophils 0.1      Absolute Immature Granulocytes 0.1      Absolute NRBCs 0.0     INFLUENZA A/B, RSV, & SARS-COV2 PCR - Normal    Influenza A PCR Negative      Influenza B PCR Negative      RSV PCR Negative      SARS CoV2 PCR Negative       Soft tissue neck CT w contrast   Final Result   IMPRESSION: Mild enlargement and enhancement of the adenoid tonsils   and posterior nasopharyngeal mucosa. No evidence of retropharyngeal or   other neck soft tissue abscess.      I have personally reviewed the examination and initial interpretation   and I agree with the findings.      SUZY PALM MD            SYSTEM ID:  [id number]             Critical care was not performed.     Medical Decision Making  The patient's presentation was of moderate complexity (an undiagnosed new problem with uncertain diagnosis).    The patient's evaluation involved:  ordering and/or review of 3+ test(s) in this encounter (see separate area of note for details)    The patient's management necessitated moderate risk (prescription drug management including medications given in the ED).    Assessment & Plan    This is a 33-year-old male with history of prior tonsillectomy present with 2 weeks of sore throat along with recent urgent care evaluation earlier today noting white count of 17.  On presentation to the department  he is afebrile and normoxic, he has reassuring physical exam including reassuring ear exam no TMJ tenderness no visible submandibular swelling and surgically absent tonsils.  He has no change in voice or drooling to suggest obvious RPA or PTA.  He does have bilateral anterior cervical adenopathy however.  Reviewed his recent urgent care workup including white count of 17 without strep or mono being positive.  Discussed with him evaluating possibility of deep space neck infection with CT of the neck which he is agreeable to.  Will repeat screening labs CBC chemistry to obtain a creatinine and place IV while awaiting test results.  Patient given Toradol in the interim.    Labs reviewed.  CBC with leukocytosis 16.6, chemistry within reference range here.  Respiratory swab was initiated and is negative.  CT neck returns with mild enlargement enhancement of the adenoid tonsils and posterior nasopharyngeal mucosa without findings for RPA or neck soft tissue abscess.  This was discussed with the patient.  At this time symptoms and presentation most consistent with a adenoid tonsillitis.  Patient given Augmentin and dexamethasone here.  He will be discharged on Augmentin also with Magic mouthwash to be used as needed.  He does not have a primary care provider.  Replaced referral to follow-up.  Of note there was also a mucosal cyst noted on 1 of patient's maxillary sinuses on his CT and he would like to follow-up with ENT for this I also placed a referral.  We discussed strict return precautions.  Patient has no other questions or concerns at this time.  Red flag signs were addressed, and they were in agreement with the patient care plan provided.    Patient seen and discussed with attending physician , who agrees with my plan of care.    I have reviewed the nursing notes. I have reviewed the findings, diagnosis, plan and need for follow up with the patient.    Discharge Medication List as of 5/14/2024  4:37 PM         START taking these medications    Details   amoxicillin-clavulanate (AUGMENTIN) 875-125 MG tablet Take 1 tablet by mouth 2 times daily for 10 days, Disp-20 tablet, R-0, E-Prescribe      magic mouthwash suspension (diphenhydrAMINE, lidocaine, aluminum-magnesium & simethicone) Swish and swallow 10 mLs in mouth every 6 hours as needed for mouth sores, Disp-240 mL, R-0, E-Prescribe             Final diagnoses:   Tonsillitis - Adenoids   Maxillary sinus cyst       Beatrice Ugalde [facility] EMERGENCY DEPARTMENT  5/14/2024    --    ED Attending Physician Attestation    I Jace Banuelos MD, cared for this patient with the Advanced Practice Provider (ASAD). I have performed a history and physical examination of the patient independent of the ASAD. I reviewed the ASAD's documentation above and agree with the documented findings and plan of care. I personally provided a substantive portion of the care for this patient, including the complete Medical Decision Making. Please see the ASAD's documentation for full details.    Summary of HPI, PE, ED Course   Patient is a 33 year old male evaluated in the emergency department for 2 weeks of sore throat, worse with eating and drinking but still able to tolerate p.o.  No breathing difficulties.  Was sent in from urgent care when he was noted to have a white count of 17.  At the urgent care strep and mono were both negative. Exam and ED course notable for erythema of the soft palate, no abnormal masses appreciated, uvula midline.  Normal voice.  Some mild cervical lymphadenopathy but no pain with range of motion, no pain with laryngeal manipulation, heart and lung exam otherwise normal.  CT scan without evidence of deep space neck infection, COVID influenza negative.  Given duration of symptoms will trial antibiotics give a dose of Decadron here in the emergency department after the completion of care in the emergency department, the patient was discharged.  Patient  given strict ER return precautions.      Jace Banuelos MD  Emergency Medicine        Jace Banuelos MD  05/15/24 7631

## 2024-05-14 NOTE — PROGRESS NOTES
Chief Complaint   Patient presents with    Pharyngitis     X2 weeks of sore throat, x2 days ago started to worsen and has swelling in back of throat     Nasal Congestion     X2 days of congestion and sinus pressure     Headache    Urgent Care     Julian was seen today for pharyngitis, nasal congestion, headache and urgent care.    Diagnoses and all orders for this visit:    Throat pain  -     Streptococcus A Rapid Screen w/Reflex to PCR - Clinic Collect  -     Group A Streptococcus PCR Throat Swab    Throat swelling  -     CBC with platelets and differential; Future  -     Mononucleosis screen; Future  -     CBC with platelets and differential  -     Mononucleosis screen    Nasal congestion    Nonintractable headache, unspecified chronicity pattern, unspecified headache type    Elevated BP without diagnosis of hypertension      Results for orders placed or performed in visit on 05/14/24   Streptococcus A Rapid Screen w/Reflex to PCR - Clinic Collect     Status: Normal    Specimen: Throat; Swab   Result Value Ref Range    Group A Strep antigen Negative Negative   CBC with platelets and differential     Status: None (In process)    Narrative    The following orders were created for panel order CBC with platelets and differential.  Procedure                               Abnormality         Status                     ---------                               -----------         ------                     CBC with platelets and d...[[id number]]                      In process                   Please view results for these tests on the individual orders.     D/d-hypertension/sinusitis/viral URI/strep infection/mono/sinusitis/peritonsillar abscess/retropharyngeal abscess  ASSESSMENT:      Throat pain  Throat swelling  Nasal congestion  Nonintractable headache, unspecified chronicity pattern, unspecified headache type  Elevated BP without diagnosis of hypertension    MDM  See orders in epic.   33-year-old male with history of  "previous sinus surgery and tonsillectomy presents with 2-week history of acute sore throat and right-sided headache and right throat pain and feeling of lump in the back of the throat.  Rapid strep test was negative but because his sore throat is so severe and his white blood cell count is very elevated with elevated neutrophil count he was advised to follow-up in the ER for further evaluation and treatment.      SUBJECTIVE:  Julian Arroyo is a 33 year old male with previous history of sinus surgery and tonsillectomy presents with a with a chief complaint of sore throat, nasal congestion and right sided headache and also thick nasal drainage .Onset of symptoms was 2 week(s) ago.    Course of illness: gradual onset.  Severity severe rating pain 8 out of 10 the sore throat is getting worse.  Describes the pain more on the right side than the left.  He denies any difficulty breathing but feels a lump in the throat.  Current and Associated symptoms: stuffy nose, sore throat, and headache  Treatment measures tried include Tylenol/Ibuprofen.  Predisposing factors include recent illness .    Past Medical History:   Diagnosis Date    Hypertension 2014     Current Outpatient Medications   Medication Sig Dispense Refill    emtricitabine-tenofovir AF (DESCOVY) 200-25 MG per tablet Take 1 tablet by mouth daily      fluticasone (FLONASE) 50 MCG/ACT nasal spray SPRAY 1 SPRAY INTO EACH NOSTRIL EVERY DAY (Patient not taking: Reported on 9/7/2023) 9.9 mL 0     Social History     Tobacco Use    Smoking status: Never     Passive exposure: Never    Smokeless tobacco: Never   Substance Use Topics    Alcohol use: Yes     Alcohol/week: 0.0 standard drinks of alcohol       ROS:  Review of systems negative except as stated above.    OBJECTIVE:   BP (!) 165/102   Pulse 93   Temp 99  F (37.2  C) (Oral)   Resp 15   Ht 1.778 m (5' 10\")   Wt 101.6 kg (224 lb)   SpO2 97%   BMI 32.14 kg/m    GENERAL APPEARANCE: healthy, alert and no " distress  EYES: EOMI,  PERRL, conjunctiva clear  HENT: ear canals and TM's normal.  Nose normal.  Pharynx erythematous and swollen uvula midline with no  exudate noted.  NECK: supple, right side of the neck-tender to palpation, no adenopathy noted  RESP: lungs clear to auscultation - no rales, rhonchi or wheezes  CV: regular rates and rhythm, normal S1 S2, no murmur noted  PSYCH: mentation appears normal    Nisha Wilson MD

## 2024-05-14 NOTE — ED TRIAGE NOTES
Started having a sore throat 2 weeks ago, and swelling of throat and difficulty swallowing started 2 days. Had an urgernt care appointment with high WBC count. Having pain of throat, ears and sharp shooting pain on R occipital area of head.     Triage Assessment (Adult)       Row Name 05/14/24 1221          Triage Assessment    Airway WDL X  difficulty swallowing        Respiratory WDL    Respiratory WDL X        Skin Circulation/Temperature WDL    Skin Circulation/Temperature WDL WDL        Cardiac WDL    Cardiac WDL X  hypertensive        Peripheral/Neurovascular WDL    Peripheral Neurovascular WDL WDL        Cognitive/Neuro/Behavioral WDL    Cognitive/Neuro/Behavioral WDL WDL

## 2024-05-14 NOTE — DISCHARGE INSTRUCTIONS
Here in the emergency room have reassuring evaluation.  You felt better after Toradol.  You were given a dose of dexamethasone here which is a long-lasting steroid that last a few days and should help with ongoing inflammation.  We discussed CT findings suggestive of infection of your adenoid tonsils without any other worrisome findings.  We discussed starting antibiotics and you are given Augmentin here neurovasc of your antibiotic course along with Magic mouthwash was sent to her preferred pharmacy.    You should follow-up with primary care I placed a referral and they should be calling you.  You should also follow-up with ENT as well to discuss the cyst noted on your CT over the right maxillary sinus.    If you develop any new or worsening symptoms, is important to return right away to the emergency department for further evaluation and management.

## 2024-05-15 ENCOUNTER — PATIENT OUTREACH (OUTPATIENT)
Dept: FAMILY MEDICINE | Facility: CLINIC | Age: 34
End: 2024-05-15
Payer: COMMERCIAL

## 2024-05-15 NOTE — TELEPHONE ENCOUNTER
PT was seen in ER on 5/14/24.  PT is to see ENT for follow up. Referral placed by ER.  Pt made new appt for 5/24/24 with new PCP clinic.    This seems like appropriate ER follow up.   clinic will close this TE.    Jh Sheppard RN-[facility]

## 2024-05-24 ENCOUNTER — OFFICE VISIT (OUTPATIENT)
Dept: INTERNAL MEDICINE | Facility: CLINIC | Age: 34
End: 2024-05-24
Payer: COMMERCIAL

## 2024-05-24 VITALS
DIASTOLIC BLOOD PRESSURE: 78 MMHG | BODY MASS INDEX: 31.82 KG/M2 | OXYGEN SATURATION: 98 % | TEMPERATURE: 98 F | SYSTOLIC BLOOD PRESSURE: 132 MMHG | WEIGHT: 221.8 LBS | HEART RATE: 66 BPM

## 2024-05-24 DIAGNOSIS — Z09 ENCOUNTER FOR EXAMINATION FOLLOWING TREATMENT AT HOSPITAL: Primary | ICD-10-CM

## 2024-05-24 DIAGNOSIS — J03.90 TONSILLITIS: ICD-10-CM

## 2024-05-24 DIAGNOSIS — J34.1 MAXILLARY SINUS CYST: ICD-10-CM

## 2024-05-24 PROCEDURE — 99213 OFFICE O/P EST LOW 20 MIN: CPT | Performed by: INTERNAL MEDICINE

## 2024-05-24 NOTE — PROGRESS NOTES
Assessment & Plan   Encounter for examination following treatment at hospital  Tonsillitis  Discussed that if his symptoms have improved, he does not need further testing and that I'm unclear why a WBC of 16k in setting of known adenoiditis was recommended to be followed up on (especially in setting of patient being treated with corticosteroids). Discussed that most important thing is how he's feeling clinically, and again he tells me today he is improved. Recommended continued supportive management and for him to follow-up with ENT in September as scheduled.    Maxillary sinus cyst  Seen incidentally on CT neck in ER. He has follow-up visit with ENT scheduled in September already, encouraged him to keep that appointment.    Signed Electronically by:  Jw Ferrari MD, MPH  [facility]  Internal Medicine    Subjective   Julian is a 33 year old presenting for the following health issues:  ER F/U  This is the first time I have met Julian, who typically gets care at clinics closer to his residence.    HPI   ED/UC Followup:  Facility: [facility]  Date of visit: 5/14/2024  Reason for visit: Pharyngitis   Current Status: better    Patient feeling improved. Tolerated medications well. Wondering about repeat CBC.        Objective    /78   Pulse 66   Temp 98  F (36.7  C) (Temporal)   Wt 100.6 kg (221 lb 12.8 oz)   SpO2 98%   BMI 31.82 kg/m    Body mass index is 31.82 kg/m .    Physical Exam   GENERAL: alert and in no distress.  EYES: conjunctivae/corneas clear. EOMs grossly intact  HENT: Facies symmetric.  RESP: No iWOB.  MSK: Moves all four extremities freely  SKIN: No significant ulcers, lesions, or rashes on the visualized portions of the skin  NEURO: CN II-XII grossly intact.

## 2024-05-29 NOTE — TELEPHONE ENCOUNTER
FUTURE VISIT INFORMATION      FUTURE VISIT INFORMATION:  Date: 24  Time: 8 AM  [place] [facility] [place]  REFERRAL INFORMATION:  Referring provider:    Referring providers clinic:    Reason for visit/diagnosis:  per patient sinus issues/ nasal congestion confirmed MPLS     RECORDS REQUESTED FROM      Clinic name Comments Records Status Imaging Status   [facility] Emergency Depart 24 referral/ ER note Epic    [facility] 24 CT neck Epic PACS   [facility] Ear Nose & Throat  [address] [place] [id number]  Ph:  [phone]   Fax: [phone] 11- Sinus Surgery     Images:  11- Ct Sinus     [employer]  req    Received rec 24- pending     Disc received 24 8:53 AM - Faxed a request to [facility] for records - Susana     -jeremy murcia [facility] ent [place] [phone]    2024 10:28 AM Called [facility]  to send us records Susana     2024 11:06 AM - Received a called from [facility] that they already sent the Op Report to us on . I asked them to refax it since I did not see it come through the fax. - Susana     2024 11:39 AM- Received records from [facility] and sent it to scan and put a copy it in Dr. Wheeler's rightFax folder. Sent a request for imaging disc.  - Susana     Imaging Disc Received 2024 7:03 AM  [id number]    Action  Imaging disc received from [facility] and taken to Kelly at [facility] [place] [facility] to be loaded into [employer] [facility] PACS.

## 2024-09-05 ENCOUNTER — PRE VISIT (OUTPATIENT)
Dept: OTOLARYNGOLOGY | Facility: CLINIC | Age: 34
End: 2024-09-05

## 2024-09-05 ENCOUNTER — OFFICE VISIT (OUTPATIENT)
Dept: OTOLARYNGOLOGY | Facility: CLINIC | Age: 34
End: 2024-09-05
Payer: COMMERCIAL

## 2024-09-05 VITALS
HEIGHT: 70 IN | DIASTOLIC BLOOD PRESSURE: 85 MMHG | SYSTOLIC BLOOD PRESSURE: 146 MMHG | OXYGEN SATURATION: 96 % | HEART RATE: 69 BPM | WEIGHT: 231.3 LBS | BODY MASS INDEX: 33.11 KG/M2

## 2024-09-05 DIAGNOSIS — J02.9 PHARYNGITIS, UNSPECIFIED ETIOLOGY: ICD-10-CM

## 2024-09-05 DIAGNOSIS — G47.30 SLEEP APNEA, UNSPECIFIED TYPE: ICD-10-CM

## 2024-09-05 DIAGNOSIS — R09.81 NASAL CONGESTION: Primary | ICD-10-CM

## 2024-09-05 PROCEDURE — 31231 NASAL ENDOSCOPY DX: CPT | Performed by: STUDENT IN AN ORGANIZED HEALTH CARE EDUCATION/TRAINING PROGRAM

## 2024-09-05 PROCEDURE — 99204 OFFICE O/P NEW MOD 45 MIN: CPT | Mod: 25 | Performed by: STUDENT IN AN ORGANIZED HEALTH CARE EDUCATION/TRAINING PROGRAM

## 2024-09-05 RX ORDER — MOMETASONE FUROATE MONOHYDRATE 50 UG/1
2 SPRAY, METERED NASAL DAILY
Qty: 17 G | Refills: 1 | Status: SHIPPED | OUTPATIENT
Start: 2024-09-05 | End: 2024-12-04

## 2024-09-05 RX ORDER — OMEPRAZOLE 40 MG/1
40 CAPSULE, DELAYED RELEASE ORAL DAILY
Qty: 30 CAPSULE | Refills: 0 | Status: SHIPPED | OUTPATIENT
Start: 2024-09-05 | End: 2024-10-05

## 2024-09-05 RX ORDER — AZELASTINE HCL 205.5 UG/1
2 SPRAY NASAL DAILY
Qty: 30 ML | Refills: 1 | Status: SHIPPED | OUTPATIENT
Start: 2024-09-05 | End: 2024-09-23

## 2024-09-05 RX ORDER — DOXYCYCLINE HYCLATE 100 MG
TABLET ORAL
COMMUNITY
Start: 2024-08-19

## 2024-09-05 ASSESSMENT — PAIN SCALES - GENERAL: PAINLEVEL: NO PAIN (0)

## 2024-09-05 NOTE — PATIENT INSTRUCTIONS
You were seen in the ENT Clinic today by Dr. Wheeler. If you have any questions or concerns after your appointment, please contact us (see below)       2.   The following recommendations have been made based upon your appointment today:   -Astepro (azelastine): 2 sprays in each nostril once daily.   -Nasonex (mometasone): 2 sprays in each nostril once daily.   -Simply saline spray.   -Omeprazole: 1 capsule by mouth daily.   -We have sent a referral to sleep medicine. They will call you to schedule. If you don't hear from a representative in 2 business days, please call [phone].      3.   Plan to return to the ENT clinic as needed.           How to Contact Us:  Send a [employer] message to your provider. Our team will respond to you via [employer]Doc. Occasionally, we will need to call you to get further information.  For urgent matters (Monday-Friday), call the ENT Clinic: [phone] and speak with a call center team member - they will route your call appropriately.   If you'd like to speak directly with a nurse, please find our contact information below. We do our best to check voicemail frequently throughout the day, and will work to call you back within 1-2 days. For urgent matters, please use the general clinic phone numbers listed above.     Cristiana MURO RN  Direct: [phone]  Cayla LUU LPN  Direct: [phone]         [facility]  Department of Otolaryngology

## 2024-09-05 NOTE — LETTER
9/5/2024       RE: Julian Arroyo  [address]  [facility] [address]     Dear Colleague,    Thank you for referring your patient, Julian Arroyo, to the [facility] E[facility] [place] [facility]. Please see a copy of my visit note below.      [facility]  New Patient Visit      Encounter date:   September 5, 2024    Referring Provider:   Beatrice Ugalde PA-C  [address] [place] [id number]    Reason for Visit: New Patient, maxillary sinus cyst      History of Present Illness:      Julian Arroyo is a 34 year old male who presents for consultation regarding maxillary sinus cyst found on CT, worsening pharyngitis, and sleep issues.    Patient states his primary issue is chronic pharyngitis and recurrent episodes of sore throat.  Patient previously had a tonsillectomy in 2011 for sleep-related concerns but over the last year has noticed that his sore throats have worsened.  Patient does endorse much worse recent acid reflux for which he is currently not taking anything.  Also endorses difficulty sleeping at night had a history of a at home sleep study and does not remember what it showed but states he did have some form of sleep apnea and was recommended to do a in-hospital sleep test but never got around to it.  Feels that he wakes up multiple times a night and does snore though it is not as prominent as it was before his surgery in 2011.    Patient endorses episodes during the year where he will have worse facial pressure and thicker drainage.  CT scan from his ER visit was normal without evidence of chronic sinusitis.  Patient states that since his bilateral max antrostomies in 2011 this did improve but still has episodes about 1-2 times a year.  Not currently on any nasal sprays or sinus rinses but does endorse previous using sinus rinse but stopped due to episodes of clear fluid from his nose when he bent over and  also previously used Flonase though felt like it made his nose get more congested.    CT demonstrated a right maxillary retention cyst without evidence of obstruction or sinus disease    Number of episodes per year: 1-2  Facial pain/pressure: intermittent  Nasal drainage: yes  Nasal obstruction: yes  Changes in smell or taste: no  Epistaxis:no    History of asthma/allergy:no    Prior medication trials: yes  Prior sinus surgery: yes  Prior allergy testing with positives: no  Prior CT scans: yes    Other otolaryngic complaints:    Sino-Nasal Outcome Test (SNOT - 22)  1. Need to Blow Nose: (P) Moderate  2. Nasal Blockage: (P) Mild or slight  3. Sneezing: (P) Mild or slight  4. Runny Nose: (P) None  5. Cough: (P) Very mild  6. Post-nasal discharge: (P) None  7. Thick nasal discharge: (P) None  8. Ear fullness: (P) Moderate  9. Dizziness: (P) None  10. Ear Pain: (P) None  11. Facial pain/pressure: (P) Moderate  12. Decreased Sense of Smell/Taste: (P) Moderate  13. Difficulty falling asleep: (P) None  14. Wake up at night: (P) Very mild  15. Lack of a good night's sleep: (P) Moderate  16. Wake up tired: (P) Severe  17. Fatigue: (P) Moderate  18. Reduced Productivity: (P) Severe  19. Reduced Concentration: (P) Severe  20. Frustrated/restless/irritable: (P) Severe  21. Sad: (P) Moderate  22. Embarrassed: (P) None  Total Score: (P) 43       Review of Systems:  A comprehensive 14-point review of systems was performed with positives and pertinent negatives listed in the HPI.    Past Medical/Surgical History:  Reviewed today with the patient. No history of major medical comorbidities. Does not take any blood thinners. No prior history of sinonasal surgery or trauma.    Allergies:     No Known Allergies    Medical History:  Past Medical History:   Diagnosis Date     Hypertension 2014        Surgical History:   Past Surgical History:   Procedure Laterality Date     SINUS SURGERY       TONSILLECTOMY          Family  History:  Family History   Problem Relation Age of Onset     Lipids Father      Alcohol/Drug Father      Hyperlipidemia Father      Hypertension Father      Hypertension Maternal Grandfather      Thyroid Disease Maternal Grandfather         Social History:   Social History     Socioeconomic History     Marital status: Single   Tobacco Use     Smoking status: Never     Passive exposure: Never     Smokeless tobacco: Never   Vaping Use     Vaping status: Never Used   Substance and Sexual Activity     Alcohol use: Yes     Alcohol/week: 0.0 standard drinks of alcohol     Drug use: No     Sexual activity: Yes     Partners: Male   Other Topics Concern     Parent/sibling w/ CABG, MI or angioplasty before 65F 55M? No     Social Determinants of Health     Financial Resource Strain: Low Risk  (5/24/2024)    Financial Resource Strain      Within the past 12 months, have you or your family members you live with been unable to get utilities (heat, electricity) when it was really needed?: No   Food Insecurity: Low Risk  (5/24/2024)    Food Insecurity      Within the past 12 months, did you worry that your food would run out before you got money to buy more?: No      Within the past 12 months, did the food you bought just not last and you didn t have money to get more?: No   Transportation Needs: Low Risk  (5/24/2024)    Transportation Needs      Within the past 12 months, has lack of transportation kept you from medical appointments, getting your medicines, non-medical meetings or appointments, work, or from getting things that you need?: No   Housing Stability: Low Risk  (5/24/2024)    Housing Stability      Do you have housing? : Yes      Are you worried about losing your housing?: No            Family History:  Reviewed with patient. No pertinent positives.    Physical Examination:      Constitutional/Psychiatric: This is a well-appearing, well-dressed patient in no acute distress. male communicates easily with no obvious  speech issues. Oriented to self and environment with appropriate conversation. Does not appear depressed, anxious, or agitated.  Head: Normocephalic. Overall inspection reveals no prior scars, lesions, or masses. Facial motion is symmetric. No sinus tenderness.  Eyes: Extra-ocular motions are intact with symmetric gaze. Conjunctiva clear. No eyelid lesions. Pupils round, symmetric, and reactive to light.  Ears: Auricles without masses or lesions bilaterally.  Oral Cavity/Oropharynx: Lips, gingiva and teeth appear healthy. Floor of mouth without masses or lesions, normal appearing salivary glands. Oral mucosal membranes are well-hydrated. Tongue is mobile without deformity. Palate elevates symmetrically. No lesions of the posterior pharynx or tonsillar fossa.  Neck/Lymphatic: Flat, symmetric without detectable lymphadenopathy. No thyroid/salivary gland tenderness or palpable nodules.  Respiratory: No increased work of breathing or respiratory distress. No audible stridor or stertor.  Peripheral/Cardiovascular: Brisk capillary refill bilaterally.   Neurologic: Cranial nerves II-XII grossly intact as tested.    Nasal examination: No lesions, masses, or scars of the external nose are visualized. I do not appreciate any external deviation of the bony nasal vault. External valves patent without inspiratory alar collapse. Columella is midline.      Imaging Review:  CT scan of the sinuses was reviewed and independently interpreted by me today.      Procedure Note: Diagnostic Nasal Endoscopy, CPT 94424  Date of Service: September 5, 2024  Provider: Errol Wheeler MD   Presumptive Diagnosis: No primary diagnosis found.  Anesthesia: Topical lidocaine and oxymetazoline via atomizer    Indication:  Evaluation of nasal/sinus complaints; inadequate visualization with anterior rhinoscopy    Description of procedure:  After obtaining consent for the procedure from the patient, the sinonasal cavity was sprayed with topical anesthetic.  A rigid 30-degree nasal endoscope was used to first used to visualize the nasal floor and the nasopharynx on the left. A second pass was then made to visualize the middle meatus and sphenoethmoid recess. Finally, the scope was turned 90-degrees and used to visualize the olfactory cleft and frontal outflow tract. A similar approach was used for the contralateral side. She tolerated the procedure well without difficulty.    Endoscopic Findings:    Abhijit-Umberto Endoscopic Scoring System  Endoscopic observation Right Left   Polyps in middle meatus (0 = absent, 1 = restricted to middle meatus, 2 = Beyond middle meatus) 0 0   Discharge (0 = absent, 1 = thin and clear, 2 = thick, purulent) 0 0   Edema (0 = absent, 1 = mild-moderate, 2 = moderate-severe) 0 0   Crusting (0 = absent, 1 = mild-moderate, 2 = moderate-severe) 0 0   Scarring (0= absent, 1 = mild-moderate, 2 = moderate-severe) 0 0   Total 0 0       Bilateral sinonasal edema is noted with out mucoid drainage.  No nasal polyposis or mucopurulent drainage is seen within the nasal cavity.  The middle meatus is clear without polyps.  The superior meatus is clear without polyps.  The sphenoethmoid recess and olfactory cleft are clear bilaterally.  No nasopharyngeal lesions are noted.  The eustachian tubes are patent and non-obstructed.    Final Assessment/Plan  Sinus symptoms, possible non-allergic rhinitis  GERD  Concern for ZEE    Discussed that his mucous retention cyst is a benign finding that does not require surgery as it is not obstructing his sinus.  Otherwise his CT demonstrates patent sinus openings and no evidence of chronic sinusitis.  Sounds like patient has 1-2 episodes of sinusitis a year which I told him is an expected level with the symptoms that he has and I doubt more sinus surgery would improve that number.  Discussed importance of finding a daily sinus regimen with nasal spray and sinus rinse.  Will prescribe Nasonex (patient did not tolerate  Flonase) and Astepro.  Discussed with patient that he can use both of them together or can try each for a month and use them when he prefers  Discussed using simply saline to help keep his nose clean as he does not like the NeilMed as when he would bend over he would have rushes of fluid  Discussed reflux Gourmet with the patient and will prescribe a 1 month course of 40 mg of omeprazole.  Discussed with him that he needs to talk to his primary care doctor about this for follow-up and if he needs additional workup  Discussed with him that based on his symptoms of sleep disturbance I am concerned he could have underlying sleep apnea which could be exacerbating his symptoms.  Will send in a referral to sleep medicine  Follow-up with me as needed      Errol Wheeler MD    [facility]  Rhinology  Endoscopic Skull Base Surgery  [facility]  Department of Otolaryngology - Head & Neck Surgery        Again, thank you for allowing me to participate in the care of your patient.      Sincerely,    Errol Wheeler MD

## 2024-09-05 NOTE — PROGRESS NOTES
[facility]  New Patient Visit      Encounter date:   September 5, 2024    Referring Provider:   Beatrice Ugalde PA-C  [address] [place] [id number]    Reason for Visit: New Patient, maxillary sinus cyst      History of Present Illness:      Julian Arroyo is a 34 year old male who presents for consultation regarding maxillary sinus cyst found on CT, worsening pharyngitis, and sleep issues.    Patient states his primary issue is chronic pharyngitis and recurrent episodes of sore throat.  Patient previously had a tonsillectomy in 2011 for sleep-related concerns but over the last year has noticed that his sore throats have worsened.  Patient does endorse much worse recent acid reflux for which he is currently not taking anything.  Also endorses difficulty sleeping at night had a history of a at home sleep study and does not remember what it showed but states he did have some form of sleep apnea and was recommended to do a in-hospital sleep test but never got around to it.  Feels that he wakes up multiple times a night and does snore though it is not as prominent as it was before his surgery in 2011.    Patient endorses episodes during the year where he will have worse facial pressure and thicker drainage.  CT scan from his ER visit was normal without evidence of chronic sinusitis.  Patient states that since his bilateral max antrostomies in 2011 this did improve but still has episodes about 1-2 times a year.  Not currently on any nasal sprays or sinus rinses but does endorse previous using sinus rinse but stopped due to episodes of clear fluid from his nose when he bent over and also previously used Flonase though felt like it made his nose get more congested.    CT demonstrated a right maxillary retention cyst without evidence of obstruction or sinus disease    Number of episodes per year: 1-2  Facial pain/pressure: intermittent  Nasal drainage: yes  Nasal obstruction: yes  Changes in smell  or taste: no  Epistaxis:no    History of asthma/allergy:no    Prior medication trials: yes  Prior sinus surgery: yes  Prior allergy testing with positives: no  Prior CT scans: yes    Other otolaryngic complaints:    Sino-Nasal Outcome Test (SNOT - 22)  1. Need to Blow Nose: (P) Moderate  2. Nasal Blockage: (P) Mild or slight  3. Sneezing: (P) Mild or slight  4. Runny Nose: (P) None  5. Cough: (P) Very mild  6. Post-nasal discharge: (P) None  7. Thick nasal discharge: (P) None  8. Ear fullness: (P) Moderate  9. Dizziness: (P) None  10. Ear Pain: (P) None  11. Facial pain/pressure: (P) Moderate  12. Decreased Sense of Smell/Taste: (P) Moderate  13. Difficulty falling asleep: (P) None  14. Wake up at night: (P) Very mild  15. Lack of a good night's sleep: (P) Moderate  16. Wake up tired: (P) Severe  17. Fatigue: (P) Moderate  18. Reduced Productivity: (P) Severe  19. Reduced Concentration: (P) Severe  20. Frustrated/restless/irritable: (P) Severe  21. Sad: (P) Moderate  22. Embarrassed: (P) None  Total Score: (P) 43       Review of Systems:  A comprehensive 14-point review of systems was performed with positives and pertinent negatives listed in the HPI.    Past Medical/Surgical History:  Reviewed today with the patient. No history of major medical comorbidities. Does not take any blood thinners. No prior history of sinonasal surgery or trauma.    Allergies:     No Known Allergies    Medical History:  Past Medical History:   Diagnosis Date    Hypertension 2014        Surgical History:   Past Surgical History:   Procedure Laterality Date    SINUS SURGERY      TONSILLECTOMY          Family History:  Family History   Problem Relation Age of Onset    Lipids Father     Alcohol/Drug Father     Hyperlipidemia Father     Hypertension Father     Hypertension Maternal Grandfather     Thyroid Disease Maternal Grandfather         Social History:   Social History     Socioeconomic History    Marital status: Single   Tobacco Use     Smoking status: Never     Passive exposure: Never    Smokeless tobacco: Never   Vaping Use    Vaping status: Never Used   Substance and Sexual Activity    Alcohol use: Yes     Alcohol/week: 0.0 standard drinks of alcohol    Drug use: No    Sexual activity: Yes     Partners: Male   Other Topics Concern    Parent/sibling w/ CABG, MI or angioplasty before 65F 55M? No     Social Determinants of Health     Financial Resource Strain: Low Risk  (5/24/2024)    Financial Resource Strain     Within the past 12 months, have you or your family members you live with been unable to get utilities (heat, electricity) when it was really needed?: No   Food Insecurity: Low Risk  (5/24/2024)    Food Insecurity     Within the past 12 months, did you worry that your food would run out before you got money to buy more?: No     Within the past 12 months, did the food you bought just not last and you didn t have money to get more?: No   Transportation Needs: Low Risk  (5/24/2024)    Transportation Needs     Within the past 12 months, has lack of transportation kept you from medical appointments, getting your medicines, non-medical meetings or appointments, work, or from getting things that you need?: No   Housing Stability: Low Risk  (5/24/2024)    Housing Stability     Do you have housing? : Yes     Are you worried about losing your housing?: No            Family History:  Reviewed with patient. No pertinent positives.    Physical Examination:      Constitutional/Psychiatric: This is a well-appearing, well-dressed patient in no acute distress. male communicates easily with no obvious speech issues. Oriented to self and environment with appropriate conversation. Does not appear depressed, anxious, or agitated.  Head: Normocephalic. Overall inspection reveals no prior scars, lesions, or masses. Facial motion is symmetric. No sinus tenderness.  Eyes: Extra-ocular motions are intact with symmetric gaze. Conjunctiva clear. No eyelid lesions.  Pupils round, symmetric, and reactive to light.  Ears: Auricles without masses or lesions bilaterally.  Oral Cavity/Oropharynx: Lips, gingiva and teeth appear healthy. Floor of mouth without masses or lesions, normal appearing salivary glands. Oral mucosal membranes are well-hydrated. Tongue is mobile without deformity. Palate elevates symmetrically. No lesions of the posterior pharynx or tonsillar fossa.  Neck/Lymphatic: Flat, symmetric without detectable lymphadenopathy. No thyroid/salivary gland tenderness or palpable nodules.  Respiratory: No increased work of breathing or respiratory distress. No audible stridor or stertor.  Peripheral/Cardiovascular: Brisk capillary refill bilaterally.   Neurologic: Cranial nerves II-XII grossly intact as tested.    Nasal examination: No lesions, masses, or scars of the external nose are visualized. I do not appreciate any external deviation of the bony nasal vault. External valves patent without inspiratory alar collapse. Columella is midline.      Imaging Review:  CT scan of the sinuses was reviewed and independently interpreted by me today.      Procedure Note: Diagnostic Nasal Endoscopy, CPT 06592  Date of Service: September 5, 2024  Provider: Errol Wheeler MD   Presumptive Diagnosis: No primary diagnosis found.  Anesthesia: Topical lidocaine and oxymetazoline via atomizer    Indication:  Evaluation of nasal/sinus complaints; inadequate visualization with anterior rhinoscopy    Description of procedure:  After obtaining consent for the procedure from the patient, the sinonasal cavity was sprayed with topical anesthetic. A rigid 30-degree nasal endoscope was used to first used to visualize the nasal floor and the nasopharynx on the left. A second pass was then made to visualize the middle meatus and sphenoethmoid recess. Finally, the scope was turned 90-degrees and used to visualize the olfactory cleft and frontal outflow tract. A similar approach was used for the  contralateral side. She tolerated the procedure well without difficulty.    Endoscopic Findings:    Abhijit-Umberto Endoscopic Scoring System  Endoscopic observation Right Left   Polyps in middle meatus (0 = absent, 1 = restricted to middle meatus, 2 = Beyond middle meatus) 0 0   Discharge (0 = absent, 1 = thin and clear, 2 = thick, purulent) 0 0   Edema (0 = absent, 1 = mild-moderate, 2 = moderate-severe) 0 0   Crusting (0 = absent, 1 = mild-moderate, 2 = moderate-severe) 0 0   Scarring (0= absent, 1 = mild-moderate, 2 = moderate-severe) 0 0   Total 0 0       Bilateral sinonasal edema is noted with out mucoid drainage.  No nasal polyposis or mucopurulent drainage is seen within the nasal cavity.  The middle meatus is clear without polyps.  The superior meatus is clear without polyps.  The sphenoethmoid recess and olfactory cleft are clear bilaterally.  No nasopharyngeal lesions are noted.  The eustachian tubes are patent and non-obstructed.    Final Assessment/Plan  Sinus symptoms, possible non-allergic rhinitis  GERD  Concern for ZEE    Discussed that his mucous retention cyst is a benign finding that does not require surgery as it is not obstructing his sinus.  Otherwise his CT demonstrates patent sinus openings and no evidence of chronic sinusitis.  Sounds like patient has 1-2 episodes of sinusitis a year which I told him is an expected level with the symptoms that he has and I doubt more sinus surgery would improve that number.  Discussed importance of finding a daily sinus regimen with nasal spray and sinus rinse.  Will prescribe Nasonex (patient did not tolerate Flonase) and Astepro.  Discussed with patient that he can use both of them together or can try each for a month and use them when he prefers  Discussed using simply saline to help keep his nose clean as he does not like the NeilMed as when he would bend over he would have rushes of fluid  Discussed reflux Gourmet with the patient and will prescribe a 1  month course of 40 mg of omeprazole.  Discussed with him that he needs to talk to his primary care doctor about this for follow-up and if he needs additional workup  Discussed with him that based on his symptoms of sleep disturbance I am concerned he could have underlying sleep apnea which could be exacerbating his symptoms.  Will send in a referral to sleep medicine  Follow-up with me as needed      Errol Wheeler MD    [facility]  Rhinology  Endoscopic Skull Base Surgery  [facility]  Department of Otolaryngology - Head & Neck Surgery

## 2024-09-12 ENCOUNTER — TELEPHONE (OUTPATIENT)
Dept: OTOLARYNGOLOGY | Facility: CLINIC | Age: 34
End: 2024-09-12
Payer: COMMERCIAL

## 2024-09-12 DIAGNOSIS — R09.81 NASAL CONGESTION: Primary | ICD-10-CM

## 2024-09-12 RX ORDER — MOMETASONE FUROATE MONOHYDRATE 50 UG/1
2 SPRAY, METERED NASAL DAILY
Qty: 17 G | Refills: 3 | Status: CANCELLED | OUTPATIENT
Start: 2024-09-12

## 2024-09-12 NOTE — TELEPHONE ENCOUNTER
Prior Authorization Retail Medication Request    Medication/Dose: mometasone furoate (Nasonex) 50 MCG/ACT nasal spray  Diagnosis and ICD code (if different than what is on RX):  R09.81 Nasal congestion  New/renewal/insurance change PA/secondary ins. PA: new  Previously Tried and Failed:  fluticasone propionate (Flonase) 50 MCG/ACT nasal spray   Rationale:  Patient did not tolerate Flonase    Insurance   Primary: see chart  Insurance ID:  see chart     Secondary (if applicable):see chart  Insurance ID:  see chart    Pharmacy Information (if different than what is on RX)  Name:  [facility]  Phone:  see chart  Fax:see chart

## 2024-09-17 NOTE — TELEPHONE ENCOUNTER
Retail Pharmacy Prior Authorization Team   Phone: [phone]    Prior Authorization Approval    Medication: MOMETASONE FUROATE 50 MCG/ACT IN AERO  Authorization Effective Date: 8/18/2024  Authorization Expiration Date: 9/17/2025  Reference #:   [id number]  Insurance Company: [facility] Non-Specialty PA's - Phone [phone] Fax [phone]  Which Pharmacy is filling the prescription: [facility] [id number] [place] [address]  Pharmacy Notified: YES  Patient Notified: YES **Instructed pharmacy to notify patient when script is ready to /ship.**

## 2024-09-20 ENCOUNTER — MYC MEDICAL ADVICE (OUTPATIENT)
Dept: OTOLARYNGOLOGY | Facility: CLINIC | Age: 34
End: 2024-09-20
Payer: COMMERCIAL

## 2024-09-23 RX ORDER — AZELASTINE HCL 205.5 UG/1
2 SPRAY NASAL DAILY
Qty: 30 ML | Refills: 1 | Status: SHIPPED | OUTPATIENT
Start: 2024-09-23

## 2025-03-09 ENCOUNTER — HEALTH MAINTENANCE LETTER (OUTPATIENT)
Age: 35
End: 2025-03-09

## 2025-03-12 ENCOUNTER — OFFICE VISIT (OUTPATIENT)
Dept: SLEEP MEDICINE | Facility: CLINIC | Age: 35
End: 2025-03-12
Attending: INTERNAL MEDICINE
Payer: COMMERCIAL

## 2025-03-12 DIAGNOSIS — G47.10 HYPERSOMNIA, UNSPECIFIED: ICD-10-CM

## 2025-03-12 NOTE — PROGRESS NOTES
Pt is completing a home sleep test. Pt was instructed on how to put on the Noxturnal T3 device and associated equipment before going to bed and given the opportunity to practice putting it on before leaving the sleep center. Pt was reminded to bring the home sleep test kit back to the center tomorrow, at the scheduled time for download and reporting. Patient was instructed to complete study using the following treatment?  None  Neck circumference: 43 CM / 17 inches.  Device number: 25  Joycelyn Archibald CMA on 3/12/2025 at 1:48 PM

## 2025-03-13 ENCOUNTER — DOCUMENTATION ONLY (OUTPATIENT)
Dept: SLEEP MEDICINE | Facility: CLINIC | Age: 35
End: 2025-03-13
Payer: COMMERCIAL

## 2025-03-13 NOTE — PROGRESS NOTES
HST POST-STUDY QUESTIONNAIRE    What time did you go to bed?  9:00 pm  How long do you think it took to fall asleep?  30 mins  What time did you wake up to start the day?  4:25 am  Did you get up during the night at all?  no  If you woke up, do you remember approximately what time(s)?   Did you have any difficulty with the equipment?  No  Did you us any type of treatment with this study?  None  Was the head of the bed elevated? No  Did you sleep in a recliner?  No  Did you stop using CPAP at least 3 days before this test?  Not currently using CPAP.  Any other information you'd like us to know?

## 2025-03-13 NOTE — PROGRESS NOTES
Pt returned HST device. It was downloaded and forwarded data to the clinical specialist for scoring.   Joycelyn Archibald CMA on 3/13/2025 at 8:37 AM

## 2025-04-14 ENCOUNTER — DOCUMENTATION ONLY (OUTPATIENT)
Dept: SLEEP MEDICINE | Facility: CLINIC | Age: 35
End: 2025-04-14
Payer: COMMERCIAL

## 2025-04-14 NOTE — PROGRESS NOTES
3 day Sleep therapy management telephone visit    Diagnostic AHI:    HST: 7        SUBJECTIVE: Patient reports doing well with CPAP. Patient denies any questions or concerns. Patient was given my contact information and instructed to reach out with any questions or concerns.       Order settings:  CPAP MIN CPAP MAX   5 cm H2O 15 cm H2O         Device settings:  CPAP MIN CPAP MAX EPR RESMED SOFT RESPONSE SETTING   5.0 cm  H20 15.0 cm  H20 TWO OFF         Patient has the following upcoming sleep appts:  Future Sleep Appointments         Provider Department    7/14/2025 4:30 PM (Arrive by 4:15 PM) Victor Manuel Rodriguez MD [facility] [place]            Replacement device: No  STM ordered by provider: Yes     Total time spent on accessing and  interpreting remote patient PAP therapy data  10 minutes    Total time spent counseling, coaching  and reviewing PAP therapy data with patient  2 minutes

## 2025-04-29 ENCOUNTER — DOCUMENTATION ONLY (OUTPATIENT)
Dept: SLEEP MEDICINE | Facility: CLINIC | Age: 35
End: 2025-04-29
Payer: COMMERCIAL

## 2025-04-29 NOTE — PROGRESS NOTES
14 day Sleep therapy management telephone visit    Diagnostic AHI:    HST: 7        SUBJECTIVE: Patient reports doing ok with CPAP. He reports its an adjustment. He feels more rested when he uses it. Encouraged consitent nightly usage.  Patient was given my contact information and instructed to reach out with any questions or concerns.       Objective measures: 14 day rolling measures   COMPLIANCE LEAK AHI AVERAGE USE IN MINUTES   64 % 11.54 0.83 233   GOAL >70% GOAL < 24 LPM GOAL <5 GOAL >240          Device settings:  CPAP MIN CPAP MAX EPR RESMED SOFT RESPONSE SETTING   5.0 cm  H20 15.0 cm  H20 TWO OFF         Patient has the following upcoming sleep appts:  Future Sleep Appointments         Provider Department    7/14/2025 4:30 PM (Arrive by 4:15 PM) Victor Manuel Rodriguez MD [facility] [place]            Replacement device: No  STM ordered by provider: Yes     Total time spent on accessing and  interpreting remote patient PAP therapy data  10 minutes    Total time spent counseling, coaching  and reviewing PAP therapy data with patient  2 minutes

## 2025-07-11 ASSESSMENT — SLEEP AND FATIGUE QUESTIONNAIRES
HOW LIKELY ARE YOU TO NOD OFF OR FALL ASLEEP WHILE LYING DOWN TO REST IN THE AFTERNOON WHEN CIRCUMSTANCES PERMIT: HIGH CHANCE OF DOZING
HOW LIKELY ARE YOU TO NOD OFF OR FALL ASLEEP WHILE WATCHING TV: MODERATE CHANCE OF DOZING
HOW LIKELY ARE YOU TO NOD OFF OR FALL ASLEEP WHEN YOU ARE A PASSENGER IN A CAR FOR AN HOUR WITHOUT A BREAK: HIGH CHANCE OF DOZING
HOW LIKELY ARE YOU TO NOD OFF OR FALL ASLEEP WHILE SITTING INACTIVE IN A PUBLIC PLACE: MODERATE CHANCE OF DOZING
HOW LIKELY ARE YOU TO NOD OFF OR FALL ASLEEP WHILE SITTING QUIETLY AFTER LUNCH WITHOUT ALCOHOL: MODERATE CHANCE OF DOZING
HOW LIKELY ARE YOU TO NOD OFF OR FALL ASLEEP IN A CAR, WHILE STOPPED FOR A FEW MINUTES IN TRAFFIC: MODERATE CHANCE OF DOZING
HOW LIKELY ARE YOU TO NOD OFF OR FALL ASLEEP WHILE SITTING AND TALKING TO SOMEONE: SLIGHT CHANCE OF DOZING
HOW LIKELY ARE YOU TO NOD OFF OR FALL ASLEEP WHILE SITTING AND READING: HIGH CHANCE OF DOZING

## 2025-07-14 ENCOUNTER — VIRTUAL VISIT (OUTPATIENT)
Dept: SLEEP MEDICINE | Facility: CLINIC | Age: 35
End: 2025-07-14
Payer: COMMERCIAL

## 2025-07-14 DIAGNOSIS — G47.33 OSA (OBSTRUCTIVE SLEEP APNEA): Primary | ICD-10-CM

## 2025-07-14 PROCEDURE — 1126F AMNT PAIN NOTED NONE PRSNT: CPT | Mod: 95 | Performed by: INTERNAL MEDICINE

## 2025-07-14 PROCEDURE — 98005 SYNCH AUDIO-VIDEO EST LOW 20: CPT | Performed by: INTERNAL MEDICINE

## 2025-07-14 ASSESSMENT — PAIN SCALES - GENERAL: PAINLEVEL_OUTOF10: NO PAIN (0)

## 2025-07-14 NOTE — PATIENT INSTRUCTIONS
Your There is no height or weight on file to calculate BMI.  Weight management is a personal decision.  If you are interested in exploring weight loss strategies, the following discussion covers the approaches that may be successful. Body mass index (BMI) is one way to tell whether you are at a healthy weight, overweight, or obese. It measures your weight in relation to your height.  A BMI of 18.5 to 24.9 is in the healthy range. A person with a BMI of 25 to 29.9 is considered overweight, and someone with a BMI of 30 or greater is considered obese. More than two-thirds of American adults are considered overweight or obese.  Being overweight or obese increases the risk for further weight gain. Excess weight may lead to heart disease and diabetes.  Creating and following plans for healthy eating and physical activity may help you improve your health.  Weight control is part of healthy lifestyle and includes exercise, emotional health, and healthy eating habits. Careful eating habits lifelong are the mainstay of weight control. Though there are significant health benefits from weight loss, long-term weight loss with diet alone may be very difficult to achieve- studies show long-term success with dietary management in less than 10% of people. Attaining a healthy weight may be especially difficult to achieve in those with severe obesity. In some cases, medications, devices and surgical management might be considered.  What can you do?  If you are overweight or obese and are interested in methods for weight loss, you should discuss this with your provider.   Consider reducing daily calorie intake by 500 calories.   Keep a food journal.   Avoiding skipping meals, consider cutting portions instead.    Diet combined with exercise helps maintain muscle while optimizing fat loss. Strength training is particularly important for building and maintaining muscle mass. Exercise helps reduce stress, increase energy, and improves  fitness. Increasing exercise without diet control, however, may not burn enough calories to loose weight.     Start walking three days a week 10-20 minutes at a time  Work towards walking thirty minutes five days a week   Eventually, increase the speed of your walking for 1-2 minutes at time    In addition, we recommend that you review healthy lifestyles and methods for weight loss available through the National Institutes of Health patient information sites:  http://win.niddk.nih.gov/publications/index.htm    And look into health and wellness programs that may be available through your health insurance provider, employer, local community center, or angel club.

## 2025-07-14 NOTE — PROGRESS NOTES
Virtual Visit Details    Type of service:  Video Visit     Originating Location (pt. Location): Home    Distant Location (provider location):  Off-site  Platform used for Video Visit: Yuli    Obstructive Sleep Apnea - PAP Follow-Up Visit:    Chief Complaint   Patient presents with    RECHECK       Julianviry Arroyo comes in today for follow-up of their mild sleep apnea, managed with CPAP.     HST on 3/12/25 at weight of 210 lbs showed an AHI of 7 per hour, with  10 per hour supine, 0  per hour prone, 0 per hour upright, 4  per hour left side, and 5 per hour right side. Baseline oxygen saturation was 94%. Time with saturation less than 89% was 5.2 minutes. Time with saturation less than 90% was 22.4 minutes. The lowest oxygen saturation was 85.0%.     Do you use a CPAP Machine at home: (Patient-Rptd) Yes  Overall, on a scale of 0-10 how would you rate your CPAP (0 poor, 10 great): (Patient-Rptd) 6  Is your mask comfortable: (Patient-Rptd) Yes  If not, why:    Is you mask leaking: (Patient-Rptd) No  If yes, where do you feel it:    How many night per week does the mask leak (0-7):    Do you notice snoring with mask on: (Patient-Rptd) No  Do you notice gasping arousals with mask on: (Patient-Rptd) No  Are you having significant oral or nasal dryness: (Patient-Rptd) No  Is the pressure setting comfortable: (Patient-Rptd) Yes  In not, why:    What type of mask do you use: (Patient-Rptd) Nasal Pillow  What is your typical bedtime: (Patient-Rptd) 8:30  How long does it take you to go to sleep on PAP therapy: (Patient-Rptd) 5 mins  What time do you typically get out of bed for the day: (Patient-Rptd) 4:30  How many hours on average per night are you using PAP therapy: (Patient-Rptd) 6.5  How many hours are you sleeping per night: (Patient-Rptd) 7  Do you feel well rested in the morning: (Patient-Rptd) No    ResMed     Auto-PAP 5.0 - 15.0 cmH2O 30 day usage data:    80% of days with > 4 hours of use. 5/30 days with no use.    Average use 322 minutes per day.   95%ile Leak 11.03 L/min.   CPAP 95% pressure 9 cm.   AHI 0.53 events per hour.     EPWORTH SLEEPINESS SCALE         7/11/2025    10:27 AM    Natoma Sleepiness Scale ( FRANTZ Moore  [id number]<br>ESS - [place]/English - Final version - 21 Nov 07 - [facility] Research [place].)   Sitting and reading High chance of dozing   Watching TV Moderate chance of dozing   Sitting, inactive in a public place (e.g. a theatre or a meeting) Moderate chance of dozing   As a passenger in a car for an hour without a break High chance of dozing   Lying down to rest in the afternoon when circumstances permit High chance of dozing   Sitting and talking to someone Slight chance of dozing   Sitting quietly after a lunch without alcohol Moderate chance of dozing   In a car, while stopped for a few minutes in traffic Moderate chance of dozing   Natoma Score (MC) 18   Natoma Score (Sleep) 18        Patient-reported         INSOMNIA SEVERITY INDEX (EDWAR)          7/11/2025    10:25 AM   Insomnia Severity Index (EDWAR)   Difficulty falling asleep 1   Difficulty staying asleep 2   Problems waking up too early 2   How SATISFIED/DISSATISFIED are you with your CURRENT sleep pattern? 2   How NOTICEABLE to others do you think your sleep problem is in terms of impairing the quality of your life? 3   How WORRIED/DISTRESSED are you about your current sleep problem? 1   To what extent do you consider your sleep problem to INTERFERE with your daily functioning (e.g. daytime fatigue, mood, ability to function at work/daily chores, concentration, memory, mood, etc.) CURRENTLY? 3   EDWAR Total Score 14        Patient-reported         Guidelines for Scoring/Interpretation:  Total score categories:  0-7 = No clinically significant insomnia   8-14 = Subthreshold insomnia   15-21 = Clinical insomnia (moderate severity)  22-28 = Clinical insomnia (severe)  Used via courtesy of www.PerkHubth.va.gov with permission from Arvind Hutchinson PhD.,  [facility]      Problem List:  Patient Active Problem List    Diagnosis Date Noted    Prehypertension 03/17/2014     Priority: Medium          There were no vitals taken for this visit.    Impression/Plan:     Mild obstructive sleep apnea.  Excessive daytime sleepiness    Patient is using CPAP regularly and is benefiting from treatment. Review of download data from his device sows regular compliance and normal residual AHI.     Patient continues to feel sleepy during the day. On weekdays, wake time is 4:30 AM and bedtime is around 8:30 PM. On weekends, sleep offset is at 7 AM. He is not on any sedating agents. As a first step, will recommended extending wake time to 5:30 - 6 AM and monitor daytime sleepiness. He was advised to update me after about 6 weeks. If he remains sleepy, a wake promoting agent can be considered.     Plan:     Continue auto PAP therapy 5-15 cm H2O       Electronically signed by Dr. Victor Manuel Rodriguez, Diplomate, Sleep Medicine, ABPN

## 2025-07-14 NOTE — NURSING NOTE
Current patient location: [address] [place] [id number]    Is the patient currently in the state of [place]? YES    Visit mode: VIDEO    If the visit is dropped, the patient can be reconnected by:VIDEO VISIT: Text to cell phone:   Telephone Information:   Mobile [phone]       Will anyone else be joining the visit? NO  (If patient encounters technical issues they should call [phone]    Are changes needed to the allergy or medication list? No    Are refills needed on medications prescribed by this physician? NO    Rooming Documentation:  Questionnaire(s) completed    Reason for visit: RECHJULI Chavez VVF